# Patient Record
Sex: FEMALE | Race: WHITE
[De-identification: names, ages, dates, MRNs, and addresses within clinical notes are randomized per-mention and may not be internally consistent; named-entity substitution may affect disease eponyms.]

---

## 2020-08-06 ENCOUNTER — HOSPITAL ENCOUNTER (EMERGENCY)
Dept: HOSPITAL 47 - EC | Age: 30
Discharge: HOME | End: 2020-08-06
Payer: COMMERCIAL

## 2020-08-06 VITALS — RESPIRATION RATE: 16 BRPM | SYSTOLIC BLOOD PRESSURE: 127 MMHG | DIASTOLIC BLOOD PRESSURE: 68 MMHG | HEART RATE: 95 BPM

## 2020-08-06 VITALS — TEMPERATURE: 97.3 F

## 2020-08-06 DIAGNOSIS — R10.11: ICD-10-CM

## 2020-08-06 DIAGNOSIS — K21.9: Primary | ICD-10-CM

## 2020-08-06 DIAGNOSIS — F41.9: ICD-10-CM

## 2020-08-06 DIAGNOSIS — R07.89: ICD-10-CM

## 2020-08-06 DIAGNOSIS — R19.7: ICD-10-CM

## 2020-08-06 DIAGNOSIS — R11.10: ICD-10-CM

## 2020-08-06 DIAGNOSIS — Z79.3: ICD-10-CM

## 2020-08-06 DIAGNOSIS — R25.1: ICD-10-CM

## 2020-08-06 DIAGNOSIS — R51: ICD-10-CM

## 2020-08-06 DIAGNOSIS — R06.02: ICD-10-CM

## 2020-08-06 DIAGNOSIS — R74.8: ICD-10-CM

## 2020-08-06 DIAGNOSIS — Z79.899: ICD-10-CM

## 2020-08-06 LAB
ALBUMIN SERPL-MCNC: 4.4 G/DL (ref 3.5–5)
ALP SERPL-CCNC: 84 U/L (ref 38–126)
ALT SERPL-CCNC: 71 U/L (ref 4–34)
ANION GAP SERPL CALC-SCNC: 9 MMOL/L
AST SERPL-CCNC: 49 U/L (ref 14–36)
BASOPHILS # BLD AUTO: 0.1 K/UL (ref 0–0.2)
BASOPHILS NFR BLD AUTO: 1 %
BUN SERPL-SCNC: 14 MG/DL (ref 7–17)
CALCIUM SPEC-MCNC: 10 MG/DL (ref 8.4–10.2)
CHLORIDE SERPL-SCNC: 102 MMOL/L (ref 98–107)
CO2 SERPL-SCNC: 25 MMOL/L (ref 22–30)
EOSINOPHIL # BLD AUTO: 0.4 K/UL (ref 0–0.7)
EOSINOPHIL NFR BLD AUTO: 5 %
ERYTHROCYTE [DISTWIDTH] IN BLOOD BY AUTOMATED COUNT: 4.82 M/UL (ref 3.8–5.4)
ERYTHROCYTE [DISTWIDTH] IN BLOOD: 12.6 % (ref 11.5–15.5)
GLUCOSE SERPL-MCNC: 112 MG/DL (ref 74–99)
HCT VFR BLD AUTO: 43.5 % (ref 34–46)
HGB BLD-MCNC: 14.7 GM/DL (ref 11.4–16)
LYMPHOCYTES # SPEC AUTO: 1.8 K/UL (ref 1–4.8)
LYMPHOCYTES NFR SPEC AUTO: 23 %
MCH RBC QN AUTO: 30.6 PG (ref 25–35)
MCHC RBC AUTO-ENTMCNC: 33.8 G/DL (ref 31–37)
MCV RBC AUTO: 90.4 FL (ref 80–100)
MONOCYTES # BLD AUTO: 0.4 K/UL (ref 0–1)
MONOCYTES NFR BLD AUTO: 5 %
NEUTROPHILS # BLD AUTO: 5.1 K/UL (ref 1.3–7.7)
NEUTROPHILS NFR BLD AUTO: 64 %
PH UR: 5.5 [PH] (ref 5–8)
PLATELET # BLD AUTO: 318 K/UL (ref 150–450)
POTASSIUM SERPL-SCNC: 4 MMOL/L (ref 3.5–5.1)
PROT SERPL-MCNC: 7.2 G/DL (ref 6.3–8.2)
RBC UR QL: 1 /HPF (ref 0–5)
SODIUM SERPL-SCNC: 136 MMOL/L (ref 137–145)
SP GR UR: 1.03 (ref 1–1.03)
SQUAMOUS UR QL AUTO: 5 /HPF (ref 0–4)
UROBILINOGEN UR QL STRIP: <2 MG/DL (ref ?–2)
WBC # BLD AUTO: 7.9 K/UL (ref 3.8–10.6)
WBC # UR AUTO: 2 /HPF (ref 0–5)

## 2020-08-06 PROCEDURE — 93005 ELECTROCARDIOGRAM TRACING: CPT

## 2020-08-06 PROCEDURE — 36415 COLL VENOUS BLD VENIPUNCTURE: CPT

## 2020-08-06 PROCEDURE — 80053 COMPREHEN METABOLIC PANEL: CPT

## 2020-08-06 PROCEDURE — 81025 URINE PREGNANCY TEST: CPT

## 2020-08-06 PROCEDURE — 83690 ASSAY OF LIPASE: CPT

## 2020-08-06 PROCEDURE — 76705 ECHO EXAM OF ABDOMEN: CPT

## 2020-08-06 PROCEDURE — 81001 URINALYSIS AUTO W/SCOPE: CPT

## 2020-08-06 PROCEDURE — 85379 FIBRIN DEGRADATION QUANT: CPT

## 2020-08-06 PROCEDURE — 85025 COMPLETE CBC W/AUTO DIFF WBC: CPT

## 2020-08-06 PROCEDURE — 96361 HYDRATE IV INFUSION ADD-ON: CPT

## 2020-08-06 PROCEDURE — 99285 EMERGENCY DEPT VISIT HI MDM: CPT

## 2020-08-06 PROCEDURE — 96374 THER/PROPH/DIAG INJ IV PUSH: CPT

## 2020-08-06 PROCEDURE — 83605 ASSAY OF LACTIC ACID: CPT

## 2020-08-06 PROCEDURE — 84484 ASSAY OF TROPONIN QUANT: CPT

## 2020-08-06 PROCEDURE — 71046 X-RAY EXAM CHEST 2 VIEWS: CPT

## 2020-08-06 NOTE — ED
General Adult HPI





- General


Chief complaint: Chest Pain


Stated complaint: chest pain, shortness of breath


Time Seen by Provider: 08/06/20 14:20


Source: patient


Mode of arrival: ambulatory


Limitations: no limitations





- History of Present Illness


Initial comments: 





Patient is a 29-year-old female presenting to the emergency department with 

multiple complaints.  Patient states that today while she was working she 

experience some blurry vision followed by a slight headache.  During this time 

patient also described an episode of chest tightness, shaking that she believes 

is from anxiety.  Patient states this chest tightness did become somewhat 

painful.  She said this episode lasted approximately one hour.  Patient has also

been experiencing epigastric discomfort for the past 3 weeks.  She does have a 

history of ulcers.  She denies any other abdominal surgeries.  She states she 

has been having intermittent diarrhea as well as increased pain after she eats 

food.  She has had a few episodes of vomiting over the past 3 weeks.  She 

describes the pain as epigastric, slightly to the right upper quadrant pain.  

She denies being pregnant at this time, is on BCP.  She denies any fever, 

chills, shortness of breath.  She denies history of blood clots, recent 

medication changes.  She has no further complaints at this time.  Upon arrival 

to the ER, patient is slightly tachycardia at 110, rest of vitals are normal.





- Related Data


                                Home Medications











 Medication  Instructions  Recorded  Confirmed


 


Cetirizine HCl [Zyrtec] 10 mg PO DAILY 08/06/20 08/06/20


 


Cider Vinegar [Apple Cider Vinegar] 300 mg PO DAILY 08/06/20 08/06/20


 


Amanda Fe 1.5-30 1 tab PO DAILY 08/06/20 08/06/20


 


Multivitamins, Thera [Multivitamin 1 tab PO DAILY 08/06/20 08/06/20





(formulary)]   








                                  Previous Rx's











 Medication  Instructions  Recorded


 


Omeprazole 40 mg PO DAILY 30 Days #30 08/06/20





 capsule. 











                                    Allergies











Allergy/AdvReac Type Severity Reaction Status Date / Time


 


No Known Allergies Allergy   Verified 08/06/20 15:05














Review of Systems


ROS Statement: 


Those systems with pertinent positive or pertinent negative responses have been 

documented in the HPI.





ROS Other: All systems not noted in ROS Statement are negative.





Past Medical History


Past Medical History: GERD/Reflux


History of Any Multi-Drug Resistant Organisms: None Reported


Past Surgical History: Orthopedic Surgery


Additional Past Surgical History / Comment(s): rt wrist


Past Psychological History: Anxiety


Smoking Status: Never smoker


Past Alcohol Use History: Rare


Past Drug Use History: None Reported





General Exam





- General Exam Comments


Initial Comments: 





GENERAL: 


Patient is well-developed and well-nourished.  Patient is nontoxic and in no 

acute distress.





HEAD: 


Atraumatic, normocephalic.





EYES:


Pupils equal round and reactive to light, extraocular movements intact, sclera 

anicteric, conjunctiva are normal.  Eyelids were unremarkable.





ENT: 


TMs normal, nares patent, oropharynx clear without exudates.  Moist mucous 

membranes.





NECK: 


Normal range of motion, supple without lymphadenopathy or JVD.





LUNGS:


Unlabored respirations.  Breath sounds clear to auscultation bilaterally and 

equal.  No wheezes rales or rhonchi.





HEART:


Regular rate and rhythm without murmurs, rubs or gallops.





ABDOMEN: 


Epigastric tenderness to palpation, mild right upper quadrant tenderness.  Soft,

normoactive bowel sounds.  No guarding, no rebound.  No masses appreciated.





: Deferred 





MUSCULOSKELETAL: 


Normal extremities with adequate strength and normal range of motion, no pitting

or edema.  No clubbing or cyanosis.





NEUROLOGICAL: 


Patient is alert and oriented x 3.  Motor and sensory are also intact.  Cranial 

nerves II through XII grossly intact.  Symmetrical smile.  Normal speech, normal

gait.   





PSYCH:


Normal mood, normal affect.





SKIN:


 Warm, Dry, normal turgor, no rashes or lesions noted.


Limitations: no limitations





Course


                                   Vital Signs











  08/06/20 08/06/20





  14:10 18:00


 


Temperature 97.3 F L 97.3 F L


 


Pulse Rate 110 H 95


 


Respiratory 20 16





Rate  


 


Blood Pressure 147/88 127/68


 


O2 Sat by Pulse 99 99





Oximetry  














EKG Findings





- EKG Comments:


EKG Findings:: Sinus tachycardia otherwise a normal ECG, no signs of acute 

ischemia.  Ventricular rate 102, AL interval 134, .





Medical Decision Making





- Medical Decision Making





Patient is a 29-year-old female here with multiple complaints including an 

episode of chest tightness, shortness of breath that lasted approximately one 

hour as well as 3 weeks of intermittent epigastric, right upper quadrant pain.  

Patient was slightly tachycardia upon arrival, rest of vital signs were normal. 

On exam patient had some epigastric, right upper quadrant tenderness, rest of 

exam is unremarkable, no neural deficits.  Lab work shows no acute abnormalities

except for slight elevation in liver enzymes.  Lipase was normal, lactic acid 

was normal.  At that time I did order an ultrasound of the gallbladder which 

revealed no significant findings, no evidence of acute cholecystitis.  Patient 

troponin and d-dimer were also normal.  Urine showed no evidence of infection.  

Patient was given some fluids, Protonix as well as a GI cocktail and reported 

improvement in her symptoms.  She has had no further chest complaints during her

ER stay.  I discussed with patient that her chest discomfort could've been 

related to an anxiety and tach, patient agrees with this.  I do feel like her 

epigastric pain could be related to her gallbladder or possible ulcer.  She does

have a history of an ulcer.  I will start patient on omeprazole 40 mg daily and 

will have her follow up with GI.  I recommended Tylenol or Motrin for her mild 

headache.  We did discuss limiting her up close computer usage for 1-2 days.  

Patient is in agreement with this plan of care and she stable for discharge.  

Return parameters were discussed with the patient she verbalized understanding. 

Case discussed with Dr. Martinez.





- Lab Data


Result diagrams: 


                                 08/06/20 15:14





                                 08/06/20 15:14


                                   Lab Results











  08/06/20 08/06/20 08/06/20 Range/Units





  15:08 15:08 15:14 


 


WBC    7.9  (3.8-10.6)  k/uL


 


RBC    4.82  (3.80-5.40)  m/uL


 


Hgb    14.7  (11.4-16.0)  gm/dL


 


Hct    43.5  (34.0-46.0)  %


 


MCV    90.4  (80.0-100.0)  fL


 


MCH    30.6  (25.0-35.0)  pg


 


MCHC    33.8  (31.0-37.0)  g/dL


 


RDW    12.6  (11.5-15.5)  %


 


Plt Count    318  (150-450)  k/uL


 


Neutrophils %    64  %


 


Lymphocytes %    23  %


 


Monocytes %    5  %


 


Eosinophils %    5  %


 


Basophils %    1  %


 


Neutrophils #    5.1  (1.3-7.7)  k/uL


 


Lymphocytes #    1.8  (1.0-4.8)  k/uL


 


Monocytes #    0.4  (0-1.0)  k/uL


 


Eosinophils #    0.4  (0-0.7)  k/uL


 


Basophils #    0.1  (0-0.2)  k/uL


 


D-Dimer  0.46    (<0.60)  mg/L FEU


 


Sodium     (137-145)  mmol/L


 


Potassium     (3.5-5.1)  mmol/L


 


Chloride     ()  mmol/L


 


Carbon Dioxide     (22-30)  mmol/L


 


Anion Gap     mmol/L


 


BUN     (7-17)  mg/dL


 


Creatinine     (0.52-1.04)  mg/dL


 


Est GFR (CKD-EPI)AfAm     (>60 ml/min/1.73 sqM)  


 


Est GFR (CKD-EPI)NonAf     (>60 ml/min/1.73 sqM)  


 


Glucose     (74-99)  mg/dL


 


Plasma Lactic Acid Howard     (0.7-2.0)  mmol/L


 


Calcium     (8.4-10.2)  mg/dL


 


Total Bilirubin     (0.2-1.3)  mg/dL


 


AST     (14-36)  U/L


 


ALT     (4-34)  U/L


 


Alkaline Phosphatase     ()  U/L


 


Troponin I   <0.012   (0.000-0.034)  ng/mL


 


Total Protein     (6.3-8.2)  g/dL


 


Albumin     (3.5-5.0)  g/dL


 


Lipase     ()  U/L


 


Urine Color     


 


Urine Appearance     (Clear)  


 


Urine pH     (5.0-8.0)  


 


Ur Specific Gravity     (1.001-1.035)  


 


Urine Protein     (Negative)  


 


Urine Glucose (UA)     (Negative)  


 


Urine Ketones     (Negative)  


 


Urine Blood     (Negative)  


 


Urine Nitrite     (Negative)  


 


Urine Bilirubin     (Negative)  


 


Urine Urobilinogen     (<2.0)  mg/dL


 


Ur Leukocyte Esterase     (Negative)  


 


Urine RBC     (0-5)  /hpf


 


Urine WBC     (0-5)  /hpf


 


Ur Squamous Epith Cells     (0-4)  /hpf


 


Urine Bacteria     (None)  /hpf


 


Urine Mucus     (None)  /hpf


 


Urine HCG, Qual     (Not Detectd)  














  08/06/20 08/06/20 08/06/20 Range/Units





  15:14 15:14 15:14 


 


WBC     (3.8-10.6)  k/uL


 


RBC     (3.80-5.40)  m/uL


 


Hgb     (11.4-16.0)  gm/dL


 


Hct     (34.0-46.0)  %


 


MCV     (80.0-100.0)  fL


 


MCH     (25.0-35.0)  pg


 


MCHC     (31.0-37.0)  g/dL


 


RDW     (11.5-15.5)  %


 


Plt Count     (150-450)  k/uL


 


Neutrophils %     %


 


Lymphocytes %     %


 


Monocytes %     %


 


Eosinophils %     %


 


Basophils %     %


 


Neutrophils #     (1.3-7.7)  k/uL


 


Lymphocytes #     (1.0-4.8)  k/uL


 


Monocytes #     (0-1.0)  k/uL


 


Eosinophils #     (0-0.7)  k/uL


 


Basophils #     (0-0.2)  k/uL


 


D-Dimer     (<0.60)  mg/L FEU


 


Sodium    136 L  (137-145)  mmol/L


 


Potassium    4.0  (3.5-5.1)  mmol/L


 


Chloride    102  ()  mmol/L


 


Carbon Dioxide    25  (22-30)  mmol/L


 


Anion Gap    9  mmol/L


 


BUN    14  (7-17)  mg/dL


 


Creatinine    0.85  (0.52-1.04)  mg/dL


 


Est GFR (CKD-EPI)AfAm    >90  (>60 ml/min/1.73 sqM)  


 


Est GFR (CKD-EPI)NonAf    >90  (>60 ml/min/1.73 sqM)  


 


Glucose    112 H  (74-99)  mg/dL


 


Plasma Lactic Acid Howard     (0.7-2.0)  mmol/L


 


Calcium    10.0  (8.4-10.2)  mg/dL


 


Total Bilirubin    0.6  (0.2-1.3)  mg/dL


 


AST    49 H  (14-36)  U/L


 


ALT    71 H  (4-34)  U/L


 


Alkaline Phosphatase    84  ()  U/L


 


Troponin I     (0.000-0.034)  ng/mL


 


Total Protein    7.2  (6.3-8.2)  g/dL


 


Albumin    4.4  (3.5-5.0)  g/dL


 


Lipase    138  ()  U/L


 


Urine Color  Yellow    


 


Urine Appearance  Cloudy H    (Clear)  


 


Urine pH  5.5    (5.0-8.0)  


 


Ur Specific Gravity  1.029    (1.001-1.035)  


 


Urine Protein  Negative    (Negative)  


 


Urine Glucose (UA)  Negative    (Negative)  


 


Urine Ketones  Trace H    (Negative)  


 


Urine Blood  Negative    (Negative)  


 


Urine Nitrite  Negative    (Negative)  


 


Urine Bilirubin  Negative    (Negative)  


 


Urine Urobilinogen  <2.0    (<2.0)  mg/dL


 


Ur Leukocyte Esterase  Negative    (Negative)  


 


Urine RBC  1    (0-5)  /hpf


 


Urine WBC  2    (0-5)  /hpf


 


Ur Squamous Epith Cells  5 H    (0-4)  /hpf


 


Urine Bacteria  Rare H    (None)  /hpf


 


Urine Mucus  Many H    (None)  /hpf


 


Urine HCG, Qual   Not Detected   (Not Detectd)  














  08/06/20 Range/Units





  15:14 


 


WBC   (3.8-10.6)  k/uL


 


RBC   (3.80-5.40)  m/uL


 


Hgb   (11.4-16.0)  gm/dL


 


Hct   (34.0-46.0)  %


 


MCV   (80.0-100.0)  fL


 


MCH   (25.0-35.0)  pg


 


MCHC   (31.0-37.0)  g/dL


 


RDW   (11.5-15.5)  %


 


Plt Count   (150-450)  k/uL


 


Neutrophils %   %


 


Lymphocytes %   %


 


Monocytes %   %


 


Eosinophils %   %


 


Basophils %   %


 


Neutrophils #   (1.3-7.7)  k/uL


 


Lymphocytes #   (1.0-4.8)  k/uL


 


Monocytes #   (0-1.0)  k/uL


 


Eosinophils #   (0-0.7)  k/uL


 


Basophils #   (0-0.2)  k/uL


 


D-Dimer   (<0.60)  mg/L FEU


 


Sodium   (137-145)  mmol/L


 


Potassium   (3.5-5.1)  mmol/L


 


Chloride   ()  mmol/L


 


Carbon Dioxide   (22-30)  mmol/L


 


Anion Gap   mmol/L


 


BUN   (7-17)  mg/dL


 


Creatinine   (0.52-1.04)  mg/dL


 


Est GFR (CKD-EPI)AfAm   (>60 ml/min/1.73 sqM)  


 


Est GFR (CKD-EPI)NonAf   (>60 ml/min/1.73 sqM)  


 


Glucose   (74-99)  mg/dL


 


Plasma Lactic Acid Howard  1.0  (0.7-2.0)  mmol/L


 


Calcium   (8.4-10.2)  mg/dL


 


Total Bilirubin   (0.2-1.3)  mg/dL


 


AST   (14-36)  U/L


 


ALT   (4-34)  U/L


 


Alkaline Phosphatase   ()  U/L


 


Troponin I   (0.000-0.034)  ng/mL


 


Total Protein   (6.3-8.2)  g/dL


 


Albumin   (3.5-5.0)  g/dL


 


Lipase   ()  U/L


 


Urine Color   


 


Urine Appearance   (Clear)  


 


Urine pH   (5.0-8.0)  


 


Ur Specific Gravity   (1.001-1.035)  


 


Urine Protein   (Negative)  


 


Urine Glucose (UA)   (Negative)  


 


Urine Ketones   (Negative)  


 


Urine Blood   (Negative)  


 


Urine Nitrite   (Negative)  


 


Urine Bilirubin   (Negative)  


 


Urine Urobilinogen   (<2.0)  mg/dL


 


Ur Leukocyte Esterase   (Negative)  


 


Urine RBC   (0-5)  /hpf


 


Urine WBC   (0-5)  /hpf


 


Ur Squamous Epith Cells   (0-4)  /hpf


 


Urine Bacteria   (None)  /hpf


 


Urine Mucus   (None)  /hpf


 


Urine HCG, Qual   (Not Detectd)  














Disposition


Clinical Impression: 


 Anxiety, Epigastric abdominal pain, GERD (gastroesophageal reflux disease)





Disposition: HOME SELF-CARE


Condition: Stable


Instructions (If sedation given, give patient instructions):  Gastritis (ED)


Additional Instructions: 


Please return to the Emergency Department if symptoms worsen or any other 

concerns.


Recommend taking omeprazole daily for 2-4 weeks.  


Follow-up with PCP and/or GI specialist.


Prescriptions: 


Omeprazole 40 mg PO DAILY 30 Days #30 capsule.dr


Is patient prescribed a controlled substance at d/c from ED?: No


Referrals: 


Nighat Rubin MD [Primary Care Provider] - 1-2 days


Debora Pemberton MD [STAFF PHYSICIAN] - 1-2 days

## 2020-08-06 NOTE — XR
EXAMINATION TYPE: XR chest 2V

 

DATE OF EXAM: 8/6/2020

 

COMPARISON: NONE

 

HISTORY: Intermittent chest pain and heaviness.

 

TECHNIQUE:  Frontal and lateral views of the chest are obtained.

 

FINDINGS:  There is no focal air space opacity, pleural effusion, or pneumothorax seen.  The cardiac 
silhouette size is within normal limits.   The osseous structures are intact.

 

IMPRESSION:  No acute cardiopulmonary process.

## 2020-08-06 NOTE — US
EXAMINATION TYPE: US gallbladder

 

DATE OF EXAM: 8/6/2020

 

COMPARISON: NONE

 

CLINICAL HISTORY: epigastric. RUQ pain. abd pain

 

EXAM MEASUREMENTS:

 

Liver Length:  13.6 cm   

Gallbladder Wall:  0.3 cm   

CBD:  0.5 cm

Right Kidney:  10.3 x 4.4 x 4.1 cm

 

Note:  Patient ate lunch.

 

Pancreas:  wnl

Liver:  wnl  

Gallbladder:  wall appears borderline thick but patient NPO 4hrs. 

**Evidence for sonographic Grande's sign:  no

CBD:  wnl 

Right Kidney:  wnl 

 

 

IMPRESSION: No acute sonographic process.

## 2022-12-14 ENCOUNTER — HOSPITAL ENCOUNTER (OUTPATIENT)
Dept: HOSPITAL 47 - OR | Age: 32
Discharge: HOME | End: 2022-12-14
Attending: SURGERY
Payer: COMMERCIAL

## 2022-12-14 VITALS — HEART RATE: 70 BPM | DIASTOLIC BLOOD PRESSURE: 71 MMHG | SYSTOLIC BLOOD PRESSURE: 101 MMHG

## 2022-12-14 VITALS — BODY MASS INDEX: 41.1 KG/M2

## 2022-12-14 VITALS — TEMPERATURE: 97.3 F

## 2022-12-14 VITALS — RESPIRATION RATE: 18 BRPM

## 2022-12-14 DIAGNOSIS — Z87.09: ICD-10-CM

## 2022-12-14 DIAGNOSIS — Z79.899: ICD-10-CM

## 2022-12-14 DIAGNOSIS — Z98.890: ICD-10-CM

## 2022-12-14 DIAGNOSIS — Z87.11: ICD-10-CM

## 2022-12-14 DIAGNOSIS — T75.3XXD: ICD-10-CM

## 2022-12-14 DIAGNOSIS — Z88.1: ICD-10-CM

## 2022-12-14 DIAGNOSIS — Z91.048: ICD-10-CM

## 2022-12-14 DIAGNOSIS — F17.200: ICD-10-CM

## 2022-12-14 DIAGNOSIS — K21.9: ICD-10-CM

## 2022-12-14 DIAGNOSIS — F10.90: ICD-10-CM

## 2022-12-14 DIAGNOSIS — K81.1: Primary | ICD-10-CM

## 2022-12-14 PROCEDURE — 47562 LAPAROSCOPIC CHOLECYSTECTOMY: CPT

## 2022-12-14 PROCEDURE — 88304 TISSUE EXAM BY PATHOLOGIST: CPT

## 2022-12-14 PROCEDURE — 81025 URINE PREGNANCY TEST: CPT

## 2022-12-14 NOTE — P.OP
Date of Procedure: 12/14/22


Preoperative Diagnosis: 


Chronic cholecystitis


Postoperative Diagnosis: 


Chronic cholecystitis


Procedure(s) Performed: 


Laparoscopic cholecystectomy


Anesthesia: ROLAND


Surgeon: Varghese Powers


Estimated Blood Loss (ml): 5


Pathology: other (Gallbladder)


Condition: stable


Disposition: PACU


Description of Procedure: 


The patient was placed on the operating table.   The patient received a general 

endotracheal tube anesthesia.    The patients abdomen was prepped and draped in

the usual sterile fashion.    Through an infraumbilical stab incision, the 

fascia of the anterior abdominal wall was grasped with a pair of Kochers and 

then the Veress needle was placed in the peritoneal cavity.   Position of the 

Veress needle was confirmed with positive drop test.   The abdomen was then 

insufflated.  After adequate insufflation, the 10 mm trocar was placed in the 

peritoneal cavity.    Following this the laparoscope was placed in the 

peritoneal cavity. The patient was placed in the head-up, right side up position

and then a 5 mm trocar was placed in the right lateral and right subcostal 

position under direct visualization.    A 8 mm trocar was placed in the 

epigastric position.  The gallbladder was grasped in the fundus and 

infundibulum.  Traction  on the gallbladder was placed in the lateral and the 

cephalad positions.  The triangle of Calot  was visualized..   The cystic duct 

was bluntly dissected until the union of the cystic duct and common bile duct 

was seen.   A critical view of safety was achieved.  The cystic duct was then 

divided and sealed with the Harmonic scissors.  A PDS Endoloop was then placed 

throughout the cystic duct stump.  The cystic artery divided and sealed with the

Harmonic scissors.   The gallbladder was then removed from the liver bed using 

Harmonic scissors.   The gallbladder was then extracted through the epigastric 

port site.  Operative field was checked for any bleeding spots and Harmonic 

scissors was used to coagulate the liver bed.    The abdomen was irrigated.   

The trocars were removed.  The skin was closed using interrupted 3-0 Vicryl 

suture.   Dermabond dressing were applied.   The patient tolerated the procedure

well.

## 2022-12-14 NOTE — P.GSHP
History of Present Illness


H&P Date: 12/14/22


Chief Complaint: Right upper quadrant pain





[age]-year-old female who presents today for laparoscopically 

cholecystectomy.  Patient's liquids were quadrant pain.  Her recent ultrasound 

shows and the gallbladder consistent with chronic cholecystitis.





Past Medical History


Past Medical History: GERD/Reflux


Additional Past Medical History / Comment(s): Hx Asthma as a child, Hx stomach 

ulcers ., gall bladder pain and diarrhea.


History of Any Multi-Drug Resistant Organisms: None Reported


Past Surgical History: Orthopedic Surgery


Additional Past Surgical History / Comment(s): rt wrist surgery with hardware 

and removal, egd.


Past Anesthesia/Blood Transfusion Reactions: Motion Sickness


Additional Past Anesthesia/Blood Transfusion Reaction / Comment(s): "Woke up too

early."


Past Psychological History: No Psychological Hx Reported


Smoking Status: Never smoker


Past Alcohol Use History: Occasional


Past Drug Use History: None Reported





- Past Family History


  ** Mother


Family Medical History: No Reported History





Medications and Allergies


                                Home Medications











 Medication  Instructions  Recorded  Confirmed  Type


 


Cetirizine HCl [Zyrtec] 10 mg PO DAILY 08/06/20 12/14/22 History


 


Drospirenone [Slynd] 4 mg PO QAM 12/09/22 12/14/22 History








                                    Allergies











Allergy/AdvReac Type Severity Reaction Status Date / Time


 


cat dander Allergy Unknown Unknown Verified 12/14/22 09:30


 


bacitracin Allergy  Rash/Hives Verified 12/14/22 09:30





[From Neosporin     





([id number])]     


 


neomycin Allergy  Rash/Hives Verified 12/14/22 09:30





[From Neosporin     





([id number])]     


 


polymyxin B Allergy  Rash/Hives Verified 12/14/22 09:30





[From Neosporin     





([id number])]     


 


adhesive AdvReac Unknown rash, Verified 12/14/22 09:30





   tears skin  














Surgical - Exam


                                   Vital Signs











Temp Pulse Resp BP Pulse Ox


 


 98.3 F   108 H  18   123/70   98 


 


 12/14/22 09:36  12/14/22 09:36  12/14/22 09:36  12/14/22 09:36  12/14/22 09:36














- General


well developed, well nourished, no distress





- Eyes


PERRL





- ENT


normal pinna





- Neck


no masses





- Respiratory


normal expansion





- Cardiovascular


Rhythm: regular





- Abdomen


Abdomen: soft, non tender





Assessment and Plan


Assessment: 





Right upper quadrant pain





Chronic cholecystitis





We'll perform laparoscopic cholecystectomy

## 2023-04-25 ENCOUNTER — HOSPITAL ENCOUNTER (OUTPATIENT)
Dept: HOSPITAL 47 - EC | Age: 33
Setting detail: OBSERVATION
LOS: 2 days | Discharge: HOME | End: 2023-04-27
Attending: HOSPITALIST | Admitting: HOSPITALIST
Payer: COMMERCIAL

## 2023-04-25 DIAGNOSIS — Z79.82: ICD-10-CM

## 2023-04-25 DIAGNOSIS — Z87.19: ICD-10-CM

## 2023-04-25 DIAGNOSIS — Z88.3: ICD-10-CM

## 2023-04-25 DIAGNOSIS — Z88.8: ICD-10-CM

## 2023-04-25 DIAGNOSIS — R73.03: ICD-10-CM

## 2023-04-25 DIAGNOSIS — Z91.09: ICD-10-CM

## 2023-04-25 DIAGNOSIS — Z90.49: ICD-10-CM

## 2023-04-25 DIAGNOSIS — R00.0: Primary | ICD-10-CM

## 2023-04-25 DIAGNOSIS — E66.01: ICD-10-CM

## 2023-04-25 DIAGNOSIS — Z20.822: ICD-10-CM

## 2023-04-25 DIAGNOSIS — Z87.11: ICD-10-CM

## 2023-04-25 DIAGNOSIS — Z79.3: ICD-10-CM

## 2023-04-25 DIAGNOSIS — K21.9: ICD-10-CM

## 2023-04-25 DIAGNOSIS — Z91.128: ICD-10-CM

## 2023-04-25 DIAGNOSIS — Z79.899: ICD-10-CM

## 2023-04-25 DIAGNOSIS — R79.1: ICD-10-CM

## 2023-04-25 DIAGNOSIS — Z98.890: ICD-10-CM

## 2023-04-25 DIAGNOSIS — E78.5: ICD-10-CM

## 2023-04-25 LAB
ALBUMIN SERPL-MCNC: 4.2 G/DL (ref 3.5–5)
ALP SERPL-CCNC: 69 U/L (ref 38–126)
ALT SERPL-CCNC: 29 U/L (ref 4–34)
ANION GAP SERPL CALC-SCNC: 11 MMOL/L
APTT BLD: 20.8 SEC (ref 22–30)
AST SERPL-CCNC: 27 U/L (ref 14–36)
BASOPHILS # BLD AUTO: 0 K/UL (ref 0–0.2)
BASOPHILS NFR BLD AUTO: 0 %
BUN SERPL-SCNC: 13 MG/DL (ref 7–17)
CALCIUM SPEC-MCNC: 9 MG/DL (ref 8.4–10.2)
CHLORIDE SERPL-SCNC: 103 MMOL/L (ref 98–107)
CO2 SERPL-SCNC: 23 MMOL/L (ref 22–30)
EOSINOPHIL # BLD AUTO: 0.1 K/UL (ref 0–0.7)
EOSINOPHIL NFR BLD AUTO: 1 %
ERYTHROCYTE [DISTWIDTH] IN BLOOD BY AUTOMATED COUNT: 4.74 M/UL (ref 3.8–5.4)
ERYTHROCYTE [DISTWIDTH] IN BLOOD: 12.8 % (ref 11.5–15.5)
GLUCOSE SERPL-MCNC: 102 MG/DL (ref 74–99)
HCT VFR BLD AUTO: 44.2 % (ref 34–46)
HGB BLD-MCNC: 14.6 GM/DL (ref 11.4–16)
INR PPP: 0.9 (ref ?–1.2)
LDH SPEC-CCNC: 209 U/L (ref 120–246)
LYMPHOCYTES # SPEC AUTO: 1 K/UL (ref 1–4.8)
LYMPHOCYTES NFR SPEC AUTO: 6 %
MAGNESIUM SPEC-SCNC: 1.8 MG/DL (ref 1.6–2.3)
MCH RBC QN AUTO: 30.8 PG (ref 25–35)
MCHC RBC AUTO-ENTMCNC: 33 G/DL (ref 31–37)
MCV RBC AUTO: 93.2 FL (ref 80–100)
MONOCYTES # BLD AUTO: 0.6 K/UL (ref 0–1)
MONOCYTES NFR BLD AUTO: 4 %
NEUTROPHILS # BLD AUTO: 14.9 K/UL (ref 1.3–7.7)
NEUTROPHILS NFR BLD AUTO: 89 %
PH UR: 5.5 [PH] (ref 5–8)
PLATELET # BLD AUTO: 286 K/UL (ref 150–450)
POTASSIUM SERPL-SCNC: 4 MMOL/L (ref 3.5–5.1)
PROT SERPL-MCNC: 7 G/DL (ref 6.3–8.2)
PT BLD: 9.3 SEC (ref 9–12)
RBC UR QL: 4 /HPF (ref 0–5)
SODIUM SERPL-SCNC: 137 MMOL/L (ref 137–145)
SP GR UR: 1.02 (ref 1–1.03)
SQUAMOUS UR QL AUTO: 13 /HPF (ref 0–4)
UROBILINOGEN UR QL STRIP: <2 MG/DL (ref ?–2)
WBC # BLD AUTO: 16.7 K/UL (ref 3.8–10.6)
WBC #/AREA URNS HPF: 7 /HPF (ref 0–5)

## 2023-04-25 PROCEDURE — 87077 CULTURE AEROBIC IDENTIFY: CPT

## 2023-04-25 PROCEDURE — 85610 PROTHROMBIN TIME: CPT

## 2023-04-25 PROCEDURE — 81001 URINALYSIS AUTO W/SCOPE: CPT

## 2023-04-25 PROCEDURE — 72192 CT PELVIS W/O DYE: CPT

## 2023-04-25 PROCEDURE — 84703 CHORIONIC GONADOTROPIN ASSAY: CPT

## 2023-04-25 PROCEDURE — 80053 COMPREHEN METABOLIC PANEL: CPT

## 2023-04-25 PROCEDURE — 86140 C-REACTIVE PROTEIN: CPT

## 2023-04-25 PROCEDURE — 85379 FIBRIN DEGRADATION QUANT: CPT

## 2023-04-25 PROCEDURE — 93005 ELECTROCARDIOGRAM TRACING: CPT

## 2023-04-25 PROCEDURE — 80306 DRUG TEST PRSMV INSTRMNT: CPT

## 2023-04-25 PROCEDURE — 83036 HEMOGLOBIN GLYCOSYLATED A1C: CPT

## 2023-04-25 PROCEDURE — 84484 ASSAY OF TROPONIN QUANT: CPT

## 2023-04-25 PROCEDURE — 80061 LIPID PANEL: CPT

## 2023-04-25 PROCEDURE — 85025 COMPLETE CBC W/AUTO DIFF WBC: CPT

## 2023-04-25 PROCEDURE — 85730 THROMBOPLASTIN TIME PARTIAL: CPT

## 2023-04-25 PROCEDURE — 80048 BASIC METABOLIC PNL TOTAL CA: CPT

## 2023-04-25 PROCEDURE — 36415 COLL VENOUS BLD VENIPUNCTURE: CPT

## 2023-04-25 PROCEDURE — 84145 PROCALCITONIN (PCT): CPT

## 2023-04-25 PROCEDURE — 93351 STRESS TTE COMPLETE: CPT

## 2023-04-25 PROCEDURE — 71046 X-RAY EXAM CHEST 2 VIEWS: CPT

## 2023-04-25 PROCEDURE — 71275 CT ANGIOGRAPHY CHEST: CPT

## 2023-04-25 PROCEDURE — 83735 ASSAY OF MAGNESIUM: CPT

## 2023-04-25 PROCEDURE — 93306 TTE W/DOPPLER COMPLETE: CPT

## 2023-04-25 PROCEDURE — 84443 ASSAY THYROID STIM HORMONE: CPT

## 2023-04-25 PROCEDURE — 87186 SC STD MICRODIL/AGAR DIL: CPT

## 2023-04-25 PROCEDURE — 87636 SARSCOV2 & INF A&B AMP PRB: CPT

## 2023-04-25 PROCEDURE — 83615 LACTATE (LD) (LDH) ENZYME: CPT

## 2023-04-25 PROCEDURE — 87086 URINE CULTURE/COLONY COUNT: CPT

## 2023-04-25 RX ADMIN — NITROGLYCERIN SCH: 20 OINTMENT TOPICAL at 23:06

## 2023-04-25 RX ADMIN — HEPARIN SODIUM SCH: 5000 INJECTION INTRAVENOUS; SUBCUTANEOUS at 20:25

## 2023-04-25 RX ADMIN — NITROGLYCERIN SCH INCH: 20 OINTMENT TOPICAL at 17:33

## 2023-04-25 RX ADMIN — HEPARIN SODIUM SCH: 5000 INJECTION INTRAVENOUS; SUBCUTANEOUS at 15:18

## 2023-04-25 RX ADMIN — CEFAZOLIN SCH MLS/HR: 330 INJECTION, POWDER, FOR SOLUTION INTRAMUSCULAR; INTRAVENOUS at 22:45

## 2023-04-25 NOTE — CT
EXAMINATION TYPE: CT chest angio for PE

 

DATE OF EXAM: 4/25/2023

 

COMPARISON: Radiograph same day.

 

HISTORY: 32-year-old female shortness of breath, elevated d-dimer

 

TECHNIQUE: Contiguous axial scanning of the chest performed with IV Contrast, patient injected with 1
00 mL of Isovue 370. Coronal/sagittal MIP reconstructions performed.

 

CT DLP: 483.6 mGycm

Automated exposure control for dose reduction was used.

 

FINDINGS: 

Heart normal size without pericardial effusion. No flattening of the interventricular septum reflux o
f contrast into the hepatic veins.

 

Aorta normal caliber with conventional arch vessel branching anatomy.

 

No thoracic lymphadenopathy by CT size criteria.

 

There is suboptimal opacification of the pulmonary arterial system with a attenuation of only 141 [place]
nsfield units. No obvious large central pulmonary embolus. Many of the lobar, segmental, and more dis
geeta branches are essentially nondiagnostic for assessment of emboli.

 

No consolidation or pleural effusion.

 

There is a tiny hiatal hernia. Otherwise, visualized upper abdomen shows no gross abnormal body.

 

Bones: No osseous destructive process.

 

 

IMPRESSION: 

SUBOPTIMAL EXAM FOR ASSESSMENT OF PULMONARY EMBOLI. NO DEFINITE LARGE CENTRAL PULMONARY EMBOLUS. LOBA
R, SEGMENTAL, AND MORE DISTAL ARTERIAL BRANCHES ARE VERY LIMITED OR NONDIAGNOSTIC.

 

NO ACUTE PULMONARY PROCESS.

 

TINY HIATAL HERNIA.

## 2023-04-25 NOTE — XR
EXAMINATION TYPE: XR chest 2V

 

DATE OF EXAM: 4/25/2023

 

COMPARISON: 8/6/2020

 

TECHNIQUE: PA and lateral views submitted.

 

HISTORY: Chest pain

 

FINDINGS:

The lungs are clear and  there is no pneumothorax, pleural effusion, or focal pneumonia.  Heart size 
normal and no overt failure. Osseous structures demonstrate hypertrophic and degenerative changes of 
the spine.

 

IMPRESSION: 

1. No acute process.

## 2023-04-25 NOTE — ED
General Adult HPI





- General


Chief complaint: Arrhythmia/Palpitations


Stated complaint: BRIANA chest pain


Time Seen by Provider: 04/25/23 09:15


Source: patient, RN notes reviewed, old records reviewed


Mode of arrival: ambulatory


Limitations: no limitations





- History of Present Illness


Initial comments: 





This is a 32-year-old female who presents emergency Department complaining that 

she woke up about 3 AM with some pressure in her chest which was worse with deep

breathing.  Patient states she had the chills but she is no longer chill.  

Patient states she has an occasional cough but nothing significant.  Patient 

states she doesn't feel short of breath just feels like she has to take a deep 

breath.  Patient also has a mild headache.  Patient denies any abdominal pain 

patient denies nausea vomiting.  Patient denies any swelling to the legs or calf

tenderness.  Patient states she is on birth control.





- Related Data


                                Home Medications











 Medication  Instructions  Recorded  Confirmed


 


Cetirizine HCl [Zyrtec] 10 mg PO DAILY 08/06/20 04/25/23


 


norethindrone-e.estradioL-iron 1 tab PO DAILY 04/25/23 04/25/23





[Amanda Fe 1.5-30 Tablet]   











                                    Allergies











Allergy/AdvReac Type Severity Reaction Status Date / Time


 


cat dander Allergy Unknown Unknown Verified 04/25/23 09:48


 


bacitracin Allergy  Rash/Hives Verified 04/25/23 09:48





[From Neosporin     





([id number])]     


 


neomycin Allergy  Rash/Hives Verified 04/25/23 09:48





[From Neosporin     





([id number])]     


 


polymyxin B Allergy  Rash/Hives Verified 04/25/23 09:48





[From Neosporin     





([id number])]     


 


adhesive AdvReac Unknown rash, Verified 04/25/23 09:48





   tears skin  














Review of Systems


ROS Statement: 


Those systems with pertinent positive or pertinent negative responses have been 

documented in the HPI.





ROS Other: All systems not noted in ROS Statement are negative.





Past Medical History


Past Medical History: GERD/Reflux


Additional Past Medical History / Comment(s): Hx Asthma as a child, Hx stomach 

ulcers ., gall bladder pain and diarrhea.


History of Any Multi-Drug Resistant Organisms: None Reported


Past Surgical History: Cholecystectomy, Orthopedic Surgery


Additional Past Surgical History / Comment(s): rt wrist surgery with hardware 

and removal, egd.


Past Anesthesia/Blood Transfusion Reactions: Motion Sickness


Additional Past Anesthesia/Blood Transfusion Reaction / Comment(s): "Woke up too

early."


Past Psychological History: No Psychological Hx Reported


Smoking Status: Never smoker


Past Alcohol Use History: Occasional


Past Drug Use History: None Reported





- Past Family History


  ** Mother


Family Medical History: No Reported History





General Exam





- General Exam Comments


Initial Comments: 





GENERAL:


Patient is well-developed and well-nourished.  Patient is nontoxic and well-

hydrated and is in no acute distress.  Patient felt warm temp orally was 99.9





ENT:


Neck is soft and supple.  No significant lymphadenopathy is noted.  Oropharynx 

is clear.  Moist mucous membranes.  Neck has full range of motion without 

eliciting any pain.  





EYES:


The sclera were anicteric and conjunctiva were pink and moist.  Extraocular 

movements were intact and pupils were equal round and reactive to light.  

Eyelids were unremarkable.





PULMONARY:


Unlabored respirations.  Good breath sounds bilaterally.  No audible rales 

rhonchi or wheezing was noted.





CARDIOVASCULAR:


Patient is tachycardic at about 125 beats a minute  





ABDOMEN:


Soft and nontender with normal bowel sounds. 





SKIN:


Skin is clear with no lesions or rashes and otherwise unremarkable.





NEUROLOGIC:


Patient is alert and oriented x3.  Cranial nerves II through XII are grossly 

intact.  Motor and sensory are also intact.  Normal speech, volume and content. 

Symmetrical smile. 





MUSCULOSKELETAL:


Normal extremities with adequate strength and full range of motion.  No lower 

extremity swelling or edema.  No calf tenderness.





LYMPHATICS:


No significant lymphadenopathy is noted





PSYCHIATRIC:


Normal psychiatric evaluation.  


Limitations: no limitations





Course


                                   Vital Signs











  04/25/23 04/25/23 04/25/23





  08:59 10:00 12:16


 


Temperature 98.3 F  98.3 F


 


Pulse Rate 140 H 123 H 101 H


 


Respiratory 18 18 18





Rate   


 


Blood Pressure 123/84 114/98 117/75


 


O2 Sat by Pulse 98 97 97





Oximetry   














  04/25/23 04/25/23





  13:16 13:17


 


Temperature  


 


Pulse Rate 133 H 105 H


 


Respiratory  





Rate  


 


Blood Pressure  


 


O2 Sat by Pulse 97 





Oximetry  














Medical Decision Making





- Medical Decision Making





EKG as interpreted by myself shows sinus tachycardia at 126 bpm OK interval 144 

QRS is 84 QT interval 360 QTC is 391.  Patient's EKG shows no ST segment 

elevation or depression.





Was pt. sent in by a medical professional or institution (, PA, NP, urgent 

care, hospital, or nursing home...) When possible be specific


@  -No


Did you speak to anyone other than the patient for history (EMS, parent, family,

police, friend...)? What history was obtained from this source 


@  -No


Did you review nursing and triage notes (agree or disagree)?  Why? 


@  -I reviewed and agree with nursing and triage notes


Were old charts reviewed (outside hosp., previous admission, EMS record, old 

EKG, old radiological studies, urgent care reports/EKG's, nursing home records)?

Report findings 


@  -No old charts were reviewed


Differential Diagnosis (chest pain, altered mental status, abdominal pain women,

abdominal pain men, vaginal bleeding, weakness, fever, dyspnea, syncope, 

headache, dizziness, GI bleed, back pain, seizure, CVA, palpatations, mental 

health, musculoskeletal)? 


@  -Differential Palpitations


Ventricular arrhythmias, atrial arrhythmias, myocardial infarction, anemia, 

thyrotoxicosis, electrolyte imbalance, hypokalemia, pulmonary embolism, 

pulmonary disease, drugs, alcohol, anxiety, stress....


This is not meant to be an all-inclusive list.


EKG interpreted by me (3pts min.).


@  -As above


X-rays interpreted by me (1pt min.).


@  -Chest x-ray was interpreted by myself shows no acute abnormality.


CT interpreted by me (1pt min.).


@  -CT of the chest was interpreted by myself shows no obvious PE


U/S interpreted by me (1pt. min.).


@  -None done


What testing was considered but not performed or refused? (CT, X-rays, U/S, 

labs)? Why?


@  -None


What meds were considered but not given or refused? Why?


@  -None


Did you discuss the management of the patient with other professionals 

(professionals i.e. , PA, NP, lab, RT, psych nurse, , , 

teacher, , )? Give summary


@  -Or he agreed to admit the patient admitted the patient wrote admitting 

orders


Was smoking cessation discussed for >3mins.?


@  -No


Was critical care preformed (if so, how long)?


@  -No


Were there social determinants of health that impacted care today? How? 

(Homelessness, low income, unemployed, alcoholism, drug addiction, 

transportation, low edu. Level, literacy, decrease access to med. care, shelter, 

rehab)?


@  -No


Was there de-escalation of care discussed even if they declined (Discuss DNR or 

withdrawal of care, Hospice)? DNR status


@  -No


What co-morbidities impacted this encounter? (DM, HTN, Smoking, COPD, CAD, Can

cer, CVA, ARF, Chemo, Hep., AIDS, mental health diagnosis, sleep apnea, morbid 

obesity)?


@  -None


Was patient admitted / discharged? Hospital course, mention meds given and 

route, prescriptions, significant lab abnormalities, going to OR and other 

pertinent info.


@  -Patient had a CAT scan and chest x-ray showed no acute abnormality.  Patient

had elevated white count but no source of infection.  Patient remained 

tachycardic even after her temperature came down back to normal.  Patient got up

and ambulate down the patel and back and heart rate was 135 beats a minute.  

Patient continued to feel some chest discomfort I spoke with Dr. Ledbetter he agreed

to admit the patient admitted the patient I consult cardiology 


Undiagnosed new problem with uncertain prognosis?


@  -No


Drug Therapy requiring intensive monitoring for toxicity (Heparin, Nitro, 

Insulin, Cardizem)?


@  -No


Were any procedures done?


@  -No


Diagnosis/symptom?


@  -Tachycardia


Acute, or Chronic, or Acute on Chronic?


@  -Acute


Uncomplicated (without systemic symptoms) or Complicated (systemic symptoms)?


@  -Complicated


Side effects of treatment?


@  -No


Exacerbation, Progression, or Severe Exacerbation?


@  -No


Poses a threat to life or bodily function? How? (Chest pain, USA, MI, pneumonia,

PE, COPD, DKA, ARF, appy, cholecystitis, CVA, Diverticulitis, Homicidal, 

Suicidal, threat to staff... and all critical care pts)


@  -This could lead to poor perfusion and eventually end organ dysfunction





- Lab Data


Result diagrams: 


                                 04/25/23 10:17





                                 04/25/23 10:17


                                   Lab Results











  04/25/23 04/25/23 04/25/23 Range/Units





  10:17 10:17 10:17 


 


WBC  16.7 H    (3.8-10.6)  k/uL


 


RBC  4.74    (3.80-5.40)  m/uL


 


Hgb  14.6    (11.4-16.0)  gm/dL


 


Hct  44.2    (34.0-46.0)  %


 


MCV  93.2    (80.0-100.0)  fL


 


MCH  30.8    (25.0-35.0)  pg


 


MCHC  33.0    (31.0-37.0)  g/dL


 


RDW  12.8    (11.5-15.5)  %


 


Plt Count  286    (150-450)  k/uL


 


MPV  8.1    


 


Neutrophils %  89    %


 


Lymphocytes %  6    %


 


Monocytes %  4    %


 


Eosinophils %  1    %


 


Basophils %  0    %


 


Neutrophils #  14.9 H    (1.3-7.7)  k/uL


 


Lymphocytes #  1.0    (1.0-4.8)  k/uL


 


Monocytes #  0.6    (0-1.0)  k/uL


 


Eosinophils #  0.1    (0-0.7)  k/uL


 


Basophils #  0.0    (0-0.2)  k/uL


 


PT   9.3   (9.0-12.0)  sec


 


INR   0.9   (<1.2)  


 


APTT   20.8 L   (22.0-30.0)  sec


 


D-Dimer   1.25 H   (<0.60)  mg/L FEU


 


Sodium    137  (137-145)  mmol/L


 


Potassium    4.0  (3.5-5.1)  mmol/L


 


Chloride    103  ()  mmol/L


 


Carbon Dioxide    23  (22-30)  mmol/L


 


Anion Gap    11  mmol/L


 


BUN    13  (7-17)  mg/dL


 


Creatinine    0.84  (0.52-1.04)  mg/dL


 


Est GFR (CKD-EPI)AfAm    >90  (>60 ml/min/1.73 sqM)  


 


Est GFR (CKD-EPI)NonAf    >90  (>60 ml/min/1.73 sqM)  


 


Glucose    102 H  (74-99)  mg/dL


 


Calcium    9.0  (8.4-10.2)  mg/dL


 


Magnesium    1.8  (1.6-2.3)  mg/dL


 


Total Bilirubin    1.0  (0.2-1.3)  mg/dL


 


AST    27  (14-36)  U/L


 


ALT    29  (4-34)  U/L


 


Alkaline Phosphatase    69  ()  U/L


 


Troponin I     (0.000-0.034)  ng/mL


 


Total Protein    7.0  (6.3-8.2)  g/dL


 


Albumin    4.2  (3.5-5.0)  g/dL


 


TSH    1.520  (0.465-4.680)  mIU/L


 


HCG, Qual    Not Detected  


 


Influenza Type A (PCR)     (Not Detectd)  


 


Influenza Type B (PCR)     (Not Detectd)  


 


RSV (PCR)     (Not Detectd)  


 


SARS-CoV-2 (PCR)     (Not Detectd)  














  04/25/23 04/25/23 Range/Units





  10:17 10:17 


 


WBC    (3.8-10.6)  k/uL


 


RBC    (3.80-5.40)  m/uL


 


Hgb    (11.4-16.0)  gm/dL


 


Hct    (34.0-46.0)  %


 


MCV    (80.0-100.0)  fL


 


MCH    (25.0-35.0)  pg


 


MCHC    (31.0-37.0)  g/dL


 


RDW    (11.5-15.5)  %


 


Plt Count    (150-450)  k/uL


 


MPV    


 


Neutrophils %    %


 


Lymphocytes %    %


 


Monocytes %    %


 


Eosinophils %    %


 


Basophils %    %


 


Neutrophils #    (1.3-7.7)  k/uL


 


Lymphocytes #    (1.0-4.8)  k/uL


 


Monocytes #    (0-1.0)  k/uL


 


Eosinophils #    (0-0.7)  k/uL


 


Basophils #    (0-0.2)  k/uL


 


PT    (9.0-12.0)  sec


 


INR    (<1.2)  


 


APTT    (22.0-30.0)  sec


 


D-Dimer    (<0.60)  mg/L FEU


 


Sodium    (137-145)  mmol/L


 


Potassium    (3.5-5.1)  mmol/L


 


Chloride    ()  mmol/L


 


Carbon Dioxide    (22-30)  mmol/L


 


Anion Gap    mmol/L


 


BUN    (7-17)  mg/dL


 


Creatinine    (0.52-1.04)  mg/dL


 


Est GFR (CKD-EPI)AfAm    (>60 ml/min/1.73 sqM)  


 


Est GFR (CKD-EPI)NonAf    (>60 ml/min/1.73 sqM)  


 


Glucose    (74-99)  mg/dL


 


Calcium    (8.4-10.2)  mg/dL


 


Magnesium    (1.6-2.3)  mg/dL


 


Total Bilirubin    (0.2-1.3)  mg/dL


 


AST    (14-36)  U/L


 


ALT    (4-34)  U/L


 


Alkaline Phosphatase    ()  U/L


 


Troponin I  <0.012   (0.000-0.034)  ng/mL


 


Total Protein    (6.3-8.2)  g/dL


 


Albumin    (3.5-5.0)  g/dL


 


TSH    (0.465-4.680)  mIU/L


 


HCG, Qual    


 


Influenza Type A (PCR)   Not Detected  (Not Detectd)  


 


Influenza Type B (PCR)   Not Detected  (Not Detectd)  


 


RSV (PCR)   Not Detected  (Not Detectd)  


 


SARS-CoV-2 (PCR)   Not Detected  (Not Detectd)  














Disposition


Clinical Impression: 


 Tachycardia





Disposition: ADMITTED AS IP TO THIS HOSP


Referrals: 


Nighat Rubin MD [Primary Care Provider] - 1-2 days


Time of Disposition: 14:07

## 2023-04-25 NOTE — P.CONS
History of Present Illness





- Reason for Consult


Consult date: 04/25/23


Sepsis


Requesting physician: Gi Ledbetter





- Chief Complaint


Shortness of breath and chest pain x one day





- History of Present Illness


Patient is a 32-year-old  female with a past medical history 

significant for asthma as a child history of GERD reflux patient is on birth 

control pills presenting to the ER this morning after pain the patient woke up 

around 3 with a pressure in her chest worse with a deep breathing symptoms 

started suddenly and progressively get worse pulmonary to come to the hospital 

before the patient went to bed he did not have any symptoms patient did have 

some nausea but no vomiting has been complaining of some headache but no other 

URI symptoms no significant cough or sputum production no abdominal pain or any 

diarrhea with the symptom the patient has been evaluated by the ER physician on 

arrival to the ER the patient was afebrile subsequently did have a low-grade 

fever of 99.9 F patient was tachycardic not hypoxic or need for supplemental 

oxygen hematocrit 16.7 with a left shift D-dimer some elevated 1.25 kidney 

function was normal electrolytes were normal liver enzymes are normal did have a

positive UA urine drug screen was negative influenza RSV and COVID testing was 

negative patient did have a chest x-ray no acute process also have a CT 

angiogram of the chest that was suboptimal for pulmonary emboli no definite 

large central embolus no acute pulmonary process that time he had an hernia 

patient was started on Rocephin infectious disease was consulted for further 

management of antibiotic therapy








Review of Systems


Positive point and negatives has been  mentioned in the HPI, complete review of 

systems was performed and all other systems are negative








Past Medical History


Past Medical History: GERD/Reflux


Additional Past Medical History / Comment(s): Hx Asthma as a child, Hx stomach 

ulcers ., gall bladder pain and diarrhea.


History of Any Multi-Drug Resistant Organisms: None Reported


Past Surgical History: Cholecystectomy, Orthopedic Surgery


Additional Past Surgical History / Comment(s): rt wrist surgery with hardware 

and removal, egd.


Past Anesthesia/Blood Transfusion Reactions: Motion Sickness


Additional Past Anesthesia/Blood Transfusion Reaction / Comm: "Woke up too 

early."


Past Psychological History: No Psychological Hx Reported


Smoking Status: Never smoker


Past Alcohol Use History: Occasional


Past Drug Use History: None Reported





- Past Family History


  ** Mother


Family Medical History: No Reported History





Medications and Allergies


                                Home Medications











 Medication  Instructions  Recorded  Confirmed  Type


 


Cetirizine HCl [Zyrtec] 10 mg PO DAILY 08/06/20 04/25/23 History


 


norethindrone-e.estradioL-iron 1 tab PO DAILY 04/25/23 04/25/23 History





[Amanda Fe 1.5-30 Tablet]    


 


Acetaminophen Tab [Tylenol] 650 mg PO Q4HR PRN  tab 04/27/23  Rx


 


Aspirin 81 mg PO DAILY #30 tab 04/27/23  Rx


 


Atorvastatin [Lipitor] 20 mg PO HS #30 tab 04/27/23  Rx


 


cefUROXime axetiL [Ceftin] 500 mg PO BID 7 Days #14 tab 04/27/23  Rx








                                    Allergies











Allergy/AdvReac Type Severity Reaction Status Date / Time


 


cat dander Allergy Unknown Unknown Verified 04/25/23 09:48


 


bacitracin Allergy  Rash/Hives Verified 04/25/23 09:48





[From Neosporin     





([id number])]     


 


neomycin Allergy  Rash/Hives Verified 04/25/23 09:48





[From Neosporin     





([id number])]     


 


polymyxin B Allergy  Rash/Hives Verified 04/25/23 09:48





[From Neosporin     





(qkw-[id number])]     


 


adhesive AdvReac Unknown rash, Verified 04/25/23 09:48





   tears skin  














Physical Exam


Vitals: 


                                   Vital Signs











  Temp Pulse Resp BP Pulse Ox


 


 04/25/23 13:17   105 H   


 


 04/25/23 13:16   133 H    97


 


 04/25/23 12:16  98.3 F  101 H  18  117/75  97


 


 04/25/23 10:00   123 H  18  114/98  97


 


 04/25/23 08:59  98.3 F  140 H  18  123/84  98








                                Intake and Output











 04/25/23 04/25/23 04/25/23





 06:59 14:59 22:59


 


Other:   


 


  Weight  108.862 kg 











GENERAL DESCRIPTION: Middle-aged female lying in bed, no distress. No tachypnea 

or accessory muscle of respiration use.


HEENT: Shows Pallor , no scleral icterus. Oral mucous membrane is dry. No 

pharyngeal erythema or thrush


NECK: Trachea central, no thyromegaly.


LUNGS: Unlabored breathing. Clear to auscultation anteriorly. No wheeze or 

crackle.


HEART: S1, S2, regular rate and rhythm. No loud murmur


ABDOMEN: Soft, no tenderness , guarding or rigidity, no organomegaly


EXTREMITIES: No edema of feet.


SKIN: No rash, no masses palpable.


NEUROLOGICAL: The patient is awake, alert, oriented x3, mood and affect normal.

















Results


CBC & Chem 7: 


                                 04/26/23 07:40





                                 04/26/23 07:40


Labs: 


                  Abnormal Lab Results - Last 24 Hours (Table)











  04/25/23 04/25/23 04/25/23 Range/Units





  10:17 10:17 10:17 


 


WBC  16.7 H    (3.8-10.6)  k/uL


 


Neutrophils #  14.9 H    (1.3-7.7)  k/uL


 


APTT   20.8 L   (22.0-30.0)  sec


 


D-Dimer   1.25 H   (<0.60)  mg/L FEU


 


Glucose    102 H  (74-99)  mg/dL


 


Urine Appearance     (Clear)  


 


Urine Ketones     (Negative)  


 


Ur Leukocyte Esterase     (Negative)  


 


Urine WBC     (0-5)  /hpf


 


Ur Squamous Epith Cells     (0-4)  /hpf


 


Urine Bacteria     (None)  /hpf


 


Urine Mucus     (None)  /hpf














  04/25/23 Range/Units





  13:41 


 


WBC   (3.8-10.6)  k/uL


 


Neutrophils #   (1.3-7.7)  k/uL


 


APTT   (22.0-30.0)  sec


 


D-Dimer   (<0.60)  mg/L FEU


 


Glucose   (74-99)  mg/dL


 


Urine Appearance  Cloudy H  (Clear)  


 


Urine Ketones  Trace H  (Negative)  


 


Ur Leukocyte Esterase  Moderate H  (Negative)  


 


Urine WBC  7 H  (0-5)  /hpf


 


Ur Squamous Epith Cells  13 H  (0-4)  /hpf


 


Urine Bacteria  Few H  (None)  /hpf


 


Urine Mucus  Occasional H  (None)  /hpf














Assessment and Plan


(1) SIRS (systemic inflammatory response syndrome)


Status: Acute   Code(s): R65.10 - SIRS OF NON-INFECTIOUS ORIGIN W/O ACUTE ORGAN 

DYSFUNCTION   SNOMED Code(s): 825222588


   


Plan: 


1patient was in the hospital with SIRS in this patient who did have a low-grade

fever tachycardia she did have predominantly respiratory symptoms however CT 

angiogram of the chest as well as chest x-ray mention acute consolidation or 

infiltrate suggestive of pneumonia CT normal chest also did not show a large 

pulmonary embolus, patient did have mildly positive UA however abdominal soft on

clinical examination no evidence of any joint swelling or cellulitis


2-we will check inflammatory markers and check blood cultures


3-May continue empiric Rocephin while waiting for the work-up to be completed


We will follow on clinical condition and cultures to further adjust medication 

if needed


Thank you for this consultation we will follow the patient along with you





Time with Patient: Greater than 30

## 2023-04-26 LAB
ANION GAP SERPL CALC-SCNC: 10.3 MMOL/L (ref 10–18)
BASOPHILS # BLD AUTO: 0.04 X 10*3/UL (ref 0–0.1)
BASOPHILS NFR BLD AUTO: 0.4 %
BUN SERPL-SCNC: 7.4 MG/DL (ref 9–27)
BUN/CREAT SERPL: 9.25 RATIO (ref 12–20)
CALCIUM SPEC-MCNC: 8.7 MG/DL (ref 8.7–10.3)
CHLORIDE SERPL-SCNC: 108 MMOL/L (ref 96–109)
CHOLEST SERPL-MCNC: 206 MG/DL (ref 0–200)
CO2 SERPL-SCNC: 21.7 MMOL/L (ref 20–27.5)
EOSINOPHIL # BLD AUTO: 0.27 X 10*3/UL (ref 0.04–0.35)
EOSINOPHIL NFR BLD AUTO: 2.7 %
ERYTHROCYTE [DISTWIDTH] IN BLOOD BY AUTOMATED COUNT: 4.1 X 10*6/UL (ref 4.1–5.2)
ERYTHROCYTE [DISTWIDTH] IN BLOOD: 12.7 % (ref 11.5–14.5)
GLUCOSE SERPL-MCNC: 97 MG/DL (ref 70–110)
HCT VFR BLD AUTO: 39.1 % (ref 37.2–46.3)
HDLC SERPL-MCNC: 50.3 MG/DL (ref 40–60)
HGB BLD-MCNC: 12.8 G/DL (ref 12–15)
IMM GRANULOCYTES BLD QL AUTO: 0.3 %
LDLC SERPL CALC-MCNC: 129.5 MG/DL (ref 0–131)
LYMPHOCYTES # SPEC AUTO: 1.61 X 10*3/UL (ref 0.9–5)
LYMPHOCYTES NFR SPEC AUTO: 16.2 %
MCH RBC QN AUTO: 31.2 PG (ref 27–32)
MCHC RBC AUTO-ENTMCNC: 32.7 G/DL (ref 32–37)
MCV RBC AUTO: 95.4 FL (ref 80–97)
MONOCYTES # BLD AUTO: 0.48 X 10*3/UL (ref 0.2–1)
MONOCYTES NFR BLD AUTO: 4.8 %
NEUTROPHILS # BLD AUTO: 7.51 X 10*3/UL (ref 1.8–7.7)
NEUTROPHILS NFR BLD AUTO: 75.6 %
NRBC BLD AUTO-RTO: 0 /100 WBCS (ref 0–0)
PLATELET # BLD AUTO: 245 X 10*3/UL (ref 140–440)
POTASSIUM SERPL-SCNC: 4 MMOL/L (ref 3.5–5.5)
SODIUM SERPL-SCNC: 140 MMOL/L (ref 135–145)
TRIGL SERPL-MCNC: 131 MG/DL (ref 0–149)
VLDLC SERPL CALC-MCNC: 26.2 MG/DL (ref 5–40)
WBC # BLD AUTO: 9.94 X 10*3/UL (ref 4.5–10)

## 2023-04-26 RX ADMIN — HEPARIN SODIUM SCH: 5000 INJECTION INTRAVENOUS; SUBCUTANEOUS at 08:04

## 2023-04-26 RX ADMIN — HEPARIN SODIUM SCH: 5000 INJECTION INTRAVENOUS; SUBCUTANEOUS at 20:37

## 2023-04-26 RX ADMIN — CEFAZOLIN SCH MLS/HR: 330 INJECTION, POWDER, FOR SOLUTION INTRAMUSCULAR; INTRAVENOUS at 08:02

## 2023-04-26 RX ADMIN — NITROGLYCERIN SCH: 20 OINTMENT TOPICAL at 05:12

## 2023-04-26 RX ADMIN — LORATADINE SCH MG: 10 TABLET ORAL at 08:04

## 2023-04-26 RX ADMIN — CEFAZOLIN SCH MLS/HR: 330 INJECTION, POWDER, FOR SOLUTION INTRAMUSCULAR; INTRAVENOUS at 17:34

## 2023-04-26 NOTE — CT
EXAMINATION TYPE: CT pelvis wo con

 

DATE OF EXAM: 4/26/2023

 

COMPARISON: None.

 

HISTORY: pelvic pain. Endometritis/pelvic infection.

 

CT DLP: 989.2 mGycm

Automated exposure control for dose reduction was used.

 

FINDINGS: 

Normal-appearing appendix from the base of cecum near coronal image 30. No suspicious small or large 
bowel dilatation.

 

Anteverted uterus. No concerning adnexal masses. No free fluid.

 

Urinary bladder appears within normal limits. Visualized osseous structures are intact. 

 

Tiny fat-containing umbilical hernia incidentally noted.

 

IMPRESSION: Unremarkable noncontrast pelvic CT study.

## 2023-04-26 NOTE — CA
Stress Echo 

 Report 

 

 Alejandra Galindo 

 

 Age:    32     Gender:    F 

 

 :    1990 

 

 Exam Date:     2023 12:02 

 

 Exam Location: [facility] 

 

 Ht (in):     64     Wt (lb):    240 

 

 Ordering Physician:        Lili Yuen 

 

 Referring Physician:       [id number] 

 

 Technician:                Cely Ford RDCS 

 Technologist 

 MRN:    [id number] 

 

 Procedure CPT: 

 

 Indication:        CP, SOB 

 

 ICD-9 Codes: 

 

 Rhythm: 

 

 Patient History:                      Cardiac arrhythmia 

 

 Cardiac Medications:                  NONE 

 

 Medications in past 24 hours: 

 

 Contrast: 

 

 Stress Results 

 

 Protocol:     Jaylon 

 

 Total dose(mL): 

 

 Exercise Duration (min:sec):          6:17 

 Max ST Depression (mm): 

 Angina Score: 

 Maria Score: 

 METS:    7.3 

 

 Resting HR:      128       Resting BP:     155    /   87 

 

 Peak HR:     171       Peak BP:     210   /   63 

 

 Max Predicted HR:       187 

 

 91     % Max Predicted HR 

 

 Target HR:     159 

 

 Double Product:       [id number] 

 

 Stress Summary:                The patient's target heart rate was  

                                achieved 

 

 BP Response:                   Normal 

 

 Reason for Termination:        Reached target heart rate or work-load 

 

 Cardiac Symptoms:              Test terminated after reaching target  

                                heart rate (85% max predicted) 

 

 ECG Analysis 

 

 Resting ECG: 

 

 Stress ECG: 

 

 Arrhythmia: 

 Echo Analysis 

 

 Resting Echo: 

 

 Peak Echo Analysis: 

 

 MEASUREMENTS (Male/Female) Normal Values 

 

 

 

 

 CONCLUSIONS 

 Good exercise tolerance 

 Normal EKG and echo in response to exercise 

 

 Dr. Gomez Garcia MD 

 (Electronically Signed) 

 Final Date:      2023 12:48

## 2023-04-26 NOTE — P.CRDCN
History of Present Illness


History of present illness: 





HISTORY OF PRESENT ILLNESS:  





This is a 32-year-old female with a past medical history significant for 

prediabetes and hyperlipidemia. Patient does not follow with a cardiologist. We 

have been asked to see the patient in consultation for chest pain. Patient 

examined at the bedside.  Patient states that yesterday morning she woke up 

around 3:00 in the morning from sleep with shortness of breath and palpitations.

 She reports chest pain when she was trying to take a deep breath yesterday.  

She reports feeling dizzy yesterday which has since resolved.  She reports her 

shortness of breath is better this morning.  She denies any further episodes of 

palpitations.  Telemetry reveals sinus mechanism.  The patient was tachycardic 

when she arrived to the hospital.  The patient reports a history of 

hyperlipidemia and states that she used to be on statin therapy.  She states she

had a lap erich performed in December 2022 and stopped taking her statin at that

time and never resumed it.  At the time of examination, the patient denies chest

pain or pressure.





* EKG reveals sinus tachycardia with no signs of acute ischemia


* Chest xray negative for acute process


* Chest CTA: Suboptimal exam for assessment of pulmonary embolism.  No definite 

  large central PE.  Lobar, segmental, and more distal arterial branches are 

  very limited or nondiagnostic.  No acute pulmonary process.  Tiny hiatal 

  hernia.


* Laboratory data: WBC 16.7.  Hemoglobin 14.6.  Platelet count 286.  D-dimer 

  1.25.  Sodium 137.  Potassium 4.0.  BUN 13.  Creatinine 0.84.  Troponin 

  negative 3.  TSH 1.520.  Pro calcitonin 0.10


* Current home cardiac medications include none


* No previous echocardiogram or cardiac catheterization available for review





REVIEW OF SYSTEMS: 


At the time of my exam:


CONSTITUTIONAL: Denies fever or chills.


HEENT: Denies blurred vision, vision changes, or eye pain. Denies hemoptysis 


CARDIOVASCULAR: Denies chest pain. Denies orthopnea. Denies PND. Denies 

palpitations


RESPIRATORY: Denies shortness of breath. 


GASTROINTESTINAL: Denies abdominal pain. Denies nausea or vomiting. 


HEMATOLOGIC: Denies bleeding disorders.


GENITOURINARY:  Denies any blood in urine.


SKIN: Denies pruitis. Denies rash.





PHYSICAL EXAM: 


VITAL SIGNS: Reviewed.


GENERAL: Well-developed in no acute distress. 


HEENT: Head is normocephalic. Pupils are equal, round. Sclerae anicteric. Mucous

membranes of the mouth are moist. Neck supple. No JVD or thyromegaly


LUNGS: Respirations even and unlabored. Lungs essentially clear to auscultation 

bilaterally.


HEART: Regular rate and rhythm.  S1 and S2 heard.


ABDOMEN: Soft. Nondistended. Nontender.


EXTREMITIES: Normal range of motion.  No clubbing or cyanosis.  Peripheral 

pulses intact.  No lower extremity edema


NEUROLOGIC: Awake and alert. Oriented x 3. 





ASSESSMENT: 


Chest pain, troponins negative 3


Sinus tachycardia


Leukocytosis with low-grade fever, etiology unclear


Dizziness, resolved


Elevated d-dimer, CT negative for PE


Prediabetic, per patient


Hyperlipidemia, stopped taking statin therapy after lap erich


Morbid obesity





PLAN: 


Obtain 2D echo to assess cardiac structure and function


Decrease aspirin to 81 mg daily


Add atorvastatin 20 mg at night.  Obtain lipid panel


Obtain hemoglobin A1c


Continue telemetry monitoring


Patient to undergo stress echocardiogram today


Infectious workup per ID. Continue antibiotics per Dr. Sheth


Further recommendations pending patient course








Nurse practitioner note has been reviewed by physician. Signing provider agrees 

with the documented findings, assessment, and plan of care. 














Past Medical History


Past Medical History: GERD/Reflux


Additional Past Medical History / Comment(s): Hx Asthma as a child, Hx stomach 

ulcers ., gall bladder pain and diarrhea.


History of Any Multi-Drug Resistant Organisms: None Reported


Past Surgical History: Cholecystectomy, Orthopedic Surgery


Additional Past Surgical History / Comment(s): rt wrist surgery with hardware 

and removal, egd.


Past Anesthesia/Blood Transfusion Reactions: Motion Sickness


Additional Past Anesthesia/Blood Transfusion Reaction / Comment(s): "Woke up too

early."


Past Psychological History: No Psychological Hx Reported


Smoking Status: Never smoker


Past Alcohol Use History: Occasional


Past Drug Use History: None Reported





- Past Family History


  ** Mother


Family Medical History: No Reported History





Medications and Allergies


                                Home Medications











 Medication  Instructions  Recorded  Confirmed  Type


 


Cetirizine HCl [Zyrtec] 10 mg PO DAILY 08/06/20 04/25/23 History


 


norethindrone-e.estradioL-iron 1 tab PO DAILY 04/25/23 04/25/23 History





[Amanda Fe 1.5-30 Tablet]    








                                    Allergies











Allergy/AdvReac Type Severity Reaction Status Date / Time


 


cat dander Allergy Unknown Unknown Verified 04/25/23 09:48


 


bacitracin Allergy  Rash/Hives Verified 04/25/23 09:48





[From Neosporin     





([id number])]     


 


neomycin Allergy  Rash/Hives Verified 04/25/23 09:48





[From Neosporin     





([id number])]     


 


polymyxin B Allergy  Rash/Hives Verified 04/25/23 09:48





[From Neosporin     





([id number])]     


 


adhesive AdvReac Unknown rash, Verified 04/25/23 09:48





   tears skin  














Physical Exam


Vitals: 


                                   Vital Signs











  Temp Pulse Pulse Resp BP BP Pulse Ox


 


 04/26/23 06:39  98.6 F   99  17   128/80  98


 


 04/26/23 02:40  98.3 F   101 H  18   114/75  99


 


 04/25/23 18:51  98.3 F   96  18   124/79  97


 


 04/25/23 17:33   90   18  124/74   97


 


 04/25/23 16:18   101 H   18  118/80   96


 


 04/25/23 15:00   108 H   18    96


 


 04/25/23 13:17   105 H     


 


 04/25/23 13:16   133 H      97


 


 04/25/23 12:16  98.3 F  101 H   18  117/75   97


 


 04/25/23 10:09  99.9 F H      


 


 04/25/23 10:00   123 H   18  114/98   97


 


 04/25/23 08:59  98.3 F  140 H   18  123/84   98








                                Intake and Output











 04/25/23 04/26/23 04/26/23





 22:59 06:59 14:59


 


Other:   


 


  # Voids 1 2 


 


  Weight 108.862 kg  














Results





                                 04/25/23 10:17





                                 04/25/23 10:17


                                 Cardiac Enzymes











  04/25/23 04/25/23 04/25/23 Range/Units





  10:17 10:17 15:22 


 


AST  27    (14-36)  U/L


 


Lactate Dehydrogenase     (120-246)  U/L


 


Troponin I   <0.012  <0.012  (0.000-0.034)  ng/mL














  04/25/23 04/25/23 Range/Units





  19:27 20:31 


 


AST    (14-36)  U/L


 


Lactate Dehydrogenase   209  (120-246)  U/L


 


Troponin I  <0.012   (0.000-0.034)  ng/mL








                                   Coagulation











  04/25/23 Range/Units





  10:17 


 


PT  9.3  (9.0-12.0)  sec


 


APTT  20.8 L  (22.0-30.0)  sec








                                       CBC











  04/25/23 Range/Units





  10:17 


 


WBC  16.7 H  (3.8-10.6)  k/uL


 


RBC  4.74  (3.80-5.40)  m/uL


 


Hgb  14.6  (11.4-16.0)  gm/dL


 


Hct  44.2  (34.0-46.0)  %


 


Plt Count  286  (150-450)  k/uL








                          Comprehensive Metabolic Panel











  04/25/23 Range/Units





  10:17 


 


Sodium  137  (137-145)  mmol/L


 


Potassium  4.0  (3.5-5.1)  mmol/L


 


Chloride  103  ()  mmol/L


 


Carbon Dioxide  23  (22-30)  mmol/L


 


BUN  13  (7-17)  mg/dL


 


Creatinine  0.84  (0.52-1.04)  mg/dL


 


Glucose  102 H  (74-99)  mg/dL


 


Calcium  9.0  (8.4-10.2)  mg/dL


 


AST  27  (14-36)  U/L


 


ALT  29  (4-34)  U/L


 


Alkaline Phosphatase  69  ()  U/L


 


Total Protein  7.0  (6.3-8.2)  g/dL


 


Albumin  4.2  (3.5-5.0)  g/dL








                               Current Medications











Generic Name Dose Route Start Last Admin





  Trade Name Freq  PRN Reason Stop Dose Admin


 


Acetaminophen  650 mg  04/25/23 20:10  04/25/23 20:24





  Acetaminophen Tab 325 Mg Tab  PO   650 mg





  Q4HR PRN   Administration





  Fever and/ or Mild Pain  


 


Alprazolam  0.25 mg  04/25/23 14:42 





  Alprazolam 0.25 Mg Tab  PO  





  TID PRN  





  Anxiety  


 


Aspirin  325 mg  04/26/23 09:00  04/26/23 08:04





  Aspirin 325 Mg Tab  PO   325 mg





  DAILY DANAY   Administration


 


Heparin Sodium (Porcine)  5,000 unit  04/25/23 14:45  04/26/23 08:04





  Heparin Sodium,Porcine/Pf 5,000 Unit/0.5 Ml Syringe  SQ   Not Given





  Q12HR DANAY  


 


Ceftriaxone Sodium 1 gm/  50 mls @ 100 mls/hr  04/25/23 14:45  04/26/23 08:02





  Sodium Chloride  IVPB   100 mls/hr





  Q24HR DANAY   Administration





  Protocol  


 


Sodium Chloride  1,000 mls @ 100 mls/hr  04/25/23 21:30  04/26/23 08:02





  Saline 0.9%  IV   100 mls/hr





  .Q10H DANAY   Administration


 


Loratadine  10 mg  04/26/23 09:00  04/26/23 08:04





  Loratadine 10 Mg Tab  PO   10 mg





  DAILY DANAY   Administration


 


Nitroglycerin  0.4 mg  04/25/23 14:08 





  Nitroglycerin Sl Tabs 0.4 Mg Tab  SUBLINGUAL  





  Q5M PRN  





  Chest Pain  








                                Intake and Output











 04/25/23 04/26/23 04/26/23





 22:59 06:59 14:59


 


Other:   


 


  # Voids 1 2 


 


  Weight 108.862 kg  








                                        





                                 04/25/23 10:17 





                                 04/25/23 10:17

## 2023-04-26 NOTE — P.PN
Subjective


Progress Note Date: 04/26/23


Principal diagnosis: 


Fever





Patient is a 32-year-old  female with a past medical history 

significant for asthma as a child history of GERD reflux patient is on birth 

control pills presenting to the ER with symptoms of chest pressure or shortness 

of breath noticed to be tachycardic and did have a low-grade fever, CT angiogram

of the chest did not show large central PE and an ammonia did have mildly 

positive UA and apparently the patient recently did have a pelvic infection 

diagnosed by her OB and treated with antibiotics patient not sure about the name


On today's evaluation of that is 04/26/2023, the patient is afebrile patient is 

very slightly comfortably and is on room air.  Denies having any chest pain no 

cough or sputum production no abdominal pain no diarrhea no urinary symptoms








Objective





- Vital Signs


Vital signs: 


                                   Vital Signs











Temp  98.6 F   04/26/23 06:39


 


Pulse  99   04/26/23 08:31


 


Resp  17   04/26/23 08:31


 


BP  128/80   04/26/23 06:39


 


Pulse Ox  98   04/26/23 06:39


 


FiO2      








                                 Intake & Output











 04/25/23 04/26/23 04/26/23





 18:59 06:59 18:59


 


Weight 108.862 kg 108.862 kg 


 


Other:   


 


  Voiding Method   Toilet


 


  # Voids  2 














- Exam


GENERAL DESCRIPTION: Middle-aged female lying in bed in no distress





RESPIRATORY SYSTEM: Unlabored breathing , decreased breath sounds at bases





HEART: S1 S2 regular rate and rhythm ,





ABDOMEN: Soft , no tenderness





EXTREMITIES: No edema feet








- Labs


CBC & Chem 7: 


                                 04/26/23 07:40





                                 04/26/23 07:40


Labs: 


                  Abnormal Lab Results - Last 24 Hours (Table)











  04/25/23 04/25/23 04/25/23 Range/Units





  10:17 10:17 10:17 


 


WBC  16.7 H    (3.8-10.6)  k/uL


 


Neutrophils #  14.9 H    (1.3-7.7)  k/uL


 


APTT   20.8 L   (22.0-30.0)  sec


 


D-Dimer   1.25 H   (<0.60)  mg/L FEU


 


Glucose    102 H  (74-99)  mg/dL


 


C-Reactive Protein     (<1.0)  mg/dL


 


Procalcitonin     (0.02-0.09)  ng/mL


 


Urine Appearance     (Clear)  


 


Urine Ketones     (Negative)  


 


Ur Leukocyte Esterase     (Negative)  


 


Urine WBC     (0-5)  /hpf


 


Ur Squamous Epith Cells     (0-4)  /hpf


 


Urine Bacteria     (None)  /hpf


 


Urine Mucus     (None)  /hpf














  04/25/23 04/25/23 04/25/23 Range/Units





  13:41 20:31 20:31 


 


WBC     (3.8-10.6)  k/uL


 


Neutrophils #     (1.3-7.7)  k/uL


 


APTT     (22.0-30.0)  sec


 


D-Dimer     (<0.60)  mg/L FEU


 


Glucose     (74-99)  mg/dL


 


C-Reactive Protein   5.7 H   (<1.0)  mg/dL


 


Procalcitonin    0.10 H  (0.02-0.09)  ng/mL


 


Urine Appearance  Cloudy H    (Clear)  


 


Urine Ketones  Trace H    (Negative)  


 


Ur Leukocyte Esterase  Moderate H    (Negative)  


 


Urine WBC  7 H    (0-5)  /hpf


 


Ur Squamous Epith Cells  13 H    (0-4)  /hpf


 


Urine Bacteria  Few H    (None)  /hpf


 


Urine Mucus  Occasional H    (None)  /hpf














Assessment and Plan


(1) SIRS (systemic inflammatory response syndrome)


Current Visit: Yes   Status: Acute   Code(s): R65.10 - SIRS OF NON-INFECTIOUS 

ORIGIN W/O ACUTE ORGAN DYSFUNCTION   SNOMED Code(s): 394308771


   


Plan: 


1patient was in the hospital with SIRS in this patient who did have a low-grade

fever tachycardia she did have predominantly respiratory symptoms however CT 

angiogram of the chest as well as chest x-ray mention acute consolidation or 

infiltrate suggestive of pneumonia CT normal chest also did not show a large 

pulmonary embolus, patient did have mildly positive UA however abdominal soft on

clinical examination no evidence of any joint swelling or cellulitis


2-we will check CT of the pelvis with a recent history of pelvic infection to 

make sure not the source of current symptoms


Patient will continue Rocephin while waiting for the cultures to finalize


Time with Patient: Less than 30

## 2023-04-26 NOTE — HP
HISTORY AND PHYSICAL



CHIEF COMPLAINT:

Chest discomfort and palpitation.



HISTORY OF PRESENT ILLNESS:

This is a 32-year-old woman, who was previously healthy except asthma, as well as

stomach ulcers, and GERD, was complaining of some chest discomfort when the patient

woke up at 3 a.m., some difficulty with deep breathing and the patient came to [facility], found to have tachycardia.  The basic labs showed WBC 16.7.  Some mild

fever is suspected.  D-dimer is 1.25.  UA showed some minimal abnormalities and the

patient admitted for further evaluation and treatment.  The chest CTA showed suboptimal

exam, but no definite large pulmonary emboli was noted.  A tiny hiatal hernia was also

noted.  There is no history of headache, loss of conscious, or seizures.



PAST MEDICAL HISTORY:

GERD, history of asthma, cholecystectomy.  The rest of the chart and rest of the

diagnosis are reviewed.



HOME MEDICATIONS:

Aspirin.



ALLERGIES:

Cat dander. Rest of the allergies are noted.



FAMILY HISTORY:

No history of heart disease or strokes in the family.



SOCIAL HISTORY:

No history of smoking.  Occasional alcohol.



REVIEW OF SYSTEMS:

A 14-point review is negative except as mentioned earlier.



PHYSICAL EXAMINATION:

VITAL SIGNS: Pulse is 101, blood pressure 170/74, respirations 18.

HEENT:  Conjunctivae normal.

NECK:  No JVD.

CARDIOVASCULAR:  S1, S2, tachycardic.

RESPIRATIONS:  Clear to auscultation.

ABDOMEN:  Soft, nontender.

LEGS:  No edema. No swelling.

NERVOUS SYSTEM:  No focal deficits.

SKIN:  No ulcer, rash, bleeding.

JOINTS:  No active deforming arthropathy.



LABORATORY DATA:

WBC 16.6. Rest of the labs are noted.



ASSESSMENT:

1. Tachycardia.  Chest discomfort for evaluation.

2. CT angio reviewed.

3. Elevated D-dimer.

4. Rule out acute urinary tract infection and sepsis.

5. Gastroesophageal reflux disease.

6. History of asthma.

7. History of stomach ulcers.

8. History of cholecystitis and cholecystectomy.



RECOMMENDATIONS AND DISCUSSION:

This is a 32-year-old woman, who presented with multiple complex medical issues.  We

will monitor the patient closely.  I would recommend continue the current medications.

I would recommend Cardiology, Infectious Disease evaluation.  Cultures will be obtained

both urine and blood and I would also initiate empiric antibiotics also.  Ultrasound of

the urinary bladder also will be recommended.  Further recommendations to follow.





MMODL / IJN: [id number] / Job#: [id number]

## 2023-04-27 VITALS — RESPIRATION RATE: 17 BRPM

## 2023-04-27 VITALS — SYSTOLIC BLOOD PRESSURE: 115 MMHG | DIASTOLIC BLOOD PRESSURE: 80 MMHG | TEMPERATURE: 98.7 F | HEART RATE: 92 BPM

## 2023-04-27 RX ADMIN — CEFAZOLIN SCH MLS/HR: 330 INJECTION, POWDER, FOR SOLUTION INTRAMUSCULAR; INTRAVENOUS at 04:12

## 2023-04-27 RX ADMIN — LORATADINE SCH MG: 10 TABLET ORAL at 09:01

## 2023-04-27 RX ADMIN — HEPARIN SODIUM SCH: 5000 INJECTION INTRAVENOUS; SUBCUTANEOUS at 09:04

## 2023-04-27 NOTE — CA
Transthoracic Echo Report 

 Name: Alejandra Galindo 

 MRN:    [id number] 

 Age:    32     Gender:     F 

 

 :    1990 

 Exam Date:     2023 11:28 

 Exam Location: [facility] 

 Ht (in):     64     Wt (lb):     240 

 Ordering Physician:        Lili Yuen 

 Attending/Referring Phys:         [id number], Alpa 

 Technician         Cely Ford RDCS 

 Procedure CPT: 

 Indications:       LV function 

 

 Cardiac Hx: 

 Technical Quality:      Fair 

 Contrast 1:                                Total Dose (mL): 

 Contrast 2:                                Total Dose (mL): 

 

 MEASUREMENTS  (Male / Female) Normal Values 

 2D ECHO 

 LV Diastolic Diameter PLAX        3.5 cm                4.2 - 5.9 / 3.9 - 5.3 cm 

 LV Systolic Diameter PLAX         1.9 cm                 

 IVS Diastolic Thickness           1.2 cm                0.6 - 1.0 / 0.6 - 0.9 cm 

 LVPW Diastolic Thickness          1.3 cm                0.6 - 1.0 / 0.6 - 0.9 cm 

 LV Relative Wall Thickness        0.7                    

 RV Internal Dim ED PLAX           3.0 cm                 

 

 M-MODE 

 Aortic Root Diameter MM           2.9 cm                 

 LA Systolic Diameter MM           2.1 cm                 

 LA Ao Ratio MM                    0.7                    

 AV Cusp Separation MM             3.5 cm                 

 

 DOPPLER 

 AV Peak Velocity                  143.4 cm/s             

 AV Peak Gradient                  8.2 mmHg               

 AV Mean Velocity                  104.8 cm/s             

 AV Mean Gradient                  5.1 mmHg               

 AV Velocity Time Integral         29.0 cm                

 LVOT Peak Velocity                114.3 cm/s             

 LVOT Peak Gradient                5.2 mmHg               

 LVOT Velocity Time Integral       29.7 cm                

 Mitral E Point Velocity           107.9 cm/s             

 Mitral A Point Velocity           66.3 cm/s              

 Mitral E to A Ratio               1.6                    

 MV Deceleration Time              190.2 ms               

 MV E' Velocity                    15.4 cm/s              

 Mitral E to MV E' Ratio           7.0                    

 TR Peak Velocity                  233.9 cm/s             

 TR Peak Gradient                  21.9 mmHg              

 Right Ventricular Systolic Press  32.5 mmHg              

 

 

 FINDINGS 

 Left Ventricle 

 Mildly increased left ventricular wall thickness. Left ventricular cavity size  

 normal. Normal left ventricular systolic function with no obvious regional wall  

 motion abnormalities. Left ventricular ejection fraction is estimated at 55-60  

 %. 

 

 Right Ventricle 

 Normal right ventricular size and function. Right ventricular systolic pressure  

 within normal limits. 

 

 Right Atrium 

 Normal right atrial size. 

 

 Left Atrium 

 Normal left atrial size. 

 

 Mitral Valve 

 Structurally normal mitral valve. No mitral stenosis, regurgitation or  

 prolapse. 

 

 Aortic Valve 

 Trileaflet aortic valve. No aortic valve stenosis or regurgitation. 

 

 Tricuspid Valve 

 Structurally normal tricuspid valve. Mild tricuspid regurgitation. 

 

 Pulmonic Valve 

 Structurally normal pulmonic valve. Trace pulmonic regurgitation. 

 

 Pericardium 

 No pericardial effusion. 

 

 Aorta 

 Normal size aortic root and proximal ascending aorta. 

 

 CONCLUSIONS 

 Poorly visualized endocardium.  Normal LV systolic function 

 Overall normal intracardiac valves 

 Previewed by:  

 Dr. Gomez Garcia MD 

 (Electronically Signed) 

 Final Date:      2023 09:50

## 2023-04-27 NOTE — PN
PROGRESS NOTE



DATE OF SERVICE:  04/26/2023



SUBJECTIVE:

This is a 32-year-old woman, who was admitted with chest discomfort and palpitation,

tachycardia, is being closely monitored at this time.  UTI is suspected.  Cultures are

negative.  Pelvis CT scan was unremarkable.



OBJECTIVE:

VITAL SIGNS: Pulse is 99, blood pressure 128/80, respirations 17.

CHEST:  Clear to auscultation.

CARDIOVASCULAR: S1, S2 muffled.

ABDOMEN:  Soft.

NERVOUS SYSTEM:  Nonfocal.



LABORATORY DATA:

Reviewed.  Procalcitonin 0.1. UA is abnormal.



ASSESSMENT:

1. Tachycardia and chest discomfort for evaluation.

2. Elevated D-dimer.

3. Possible systemic inflammatory response syndrome with possible acute urinary tract

    infection.

4. Gastroesophageal reflux disease.

5. History of asthma.

6. History of stomach ulcers.

7. History of cholecystitis and cholecystectomy.



RECOMMENDATIONS AND DISCUSSION:

I recommend to continue current medications. Continue symptomatic treatment. Otherwise,

as mentioned earlier, continue with antibiotics.  Follow the cultures.  Closely follow

with Infectious Disease.  Cardiology is also following the patient closely.  Stress

echo was reported as normal and further recommendations to follow.





MMODL / IJN: [id number] / Job#: [id number]

## 2023-04-29 NOTE — P.DS
Providers


Date of admission: 


04/26/23 13:05





Expected date of discharge: 04/27/23


Attending physician: 


Gi Ledbetter





Consults: 





                                        





04/25/23 14:43


Consult Physician Routine 


   Consulting Provider: Alban Sheth


   Consult Reason/Comments: sepsis


   Do you want consulting provider notified?: Yes











Primary care physician: 


Nighat Rubin





Hospital Course: 








Final diagnosis





Tachycardia and chest discomfort, ruled out ACS


Elevated d-dimer with no evidence of PE


Possible Sirs with possible acute urinary tract infection, present on admission


Gastroesophageal reflux disease


History of asthma, not an exacerbation


History of stomach ulcers


History of cholecystitis with cholecystectomy


Morbid obesity with a BMI of 41.2








Discharge disposition


Patient is being discharged in a stable condition with guarded prognosis to 

home.  Patient will follow-up with Dr. Rubin  in the outpatient setting upon

discharge.  Patient is to continue with oral Ceftin 500 mg twice daily for 1 

week and outpatient follow-up with cardiology as scheduled.  Total time taken is

greater than 35 minutes.





Hospital course


This is a 32-year-old female who was recently admitted with chest pain and 

elevated heart rate with concerns of urinary tract infection with SIRS present 

on admission and being closely monitored.  Patient was evaluated by infectious 

disease maintained on ceftriaxone showing clinical improvement and will continue

on oral Ceftin twice daily for the next 1 week.  Patient also evaluated by 

cardiology and was cleared for outpatient follow.  Please refer to consultation 

notes for further HPI. Currently no reports of chest pain, shortness of breath, 

or palpitations.  Patient is afebrile.  No reports of nausea or vomiting and 

patient is tolerating diet.  Patient will be discharged home today.





Physical exam:








Gen: This is a 32-year-old female who is awake, alert and oriented 3, well-

developed, well-nourished, morbidly obese


HEENT: Head is atraumatic, normocephalic. Pupils equal, round. Sclerae is 

anicteric. 


NECK: Supple. No JVD. No lymphadenopathy. No thyromegaly. 


LUNGS: Clear to auscultation. No wheezes or rhonchi.  No intercostal 

retractions.


HEART: Regular rate and rhythm. No murmur. 


ABDOMEN: Soft. Bowel sounds are present. No masses.  Some lower abdominal 

tenderness on palpation.


EXTREMITIES: No pedal edema.  No calf tenderness.


NEUROLOGICAL: Patient is awake, alert and oriented x3. Cranial nerves 2 through 

12 are grossly intact. 





Please refer to medication reconciliation sheet for a list of medications.





The impression and plan of care has been dictated by Carmenza Sheth, Nurse 

Practitioner as directed.





Dr. Anatoly MD


I have performed a history and examination and MDM of this patient, discussed 

the same with the dictator, and  agree with the dictator's assessment and plan 

as written ,documented as a scribe. Based on total visit time,  I have performed

more than 50% of the visit.  


Patient Condition at Discharge: Stable





Plan - Discharge Summary


New Discharge Prescriptions: 


New


   Aspirin 81 mg PO DAILY #30 tab


   Acetaminophen Tab [Tylenol] 650 mg PO Q4HR PRN  tab


     PRN Reason: Fever and/ or Mild Pain


   Atorvastatin [Lipitor] 20 mg PO HS #30 tab


   cefUROXime axetiL [Ceftin] 500 mg PO BID 7 Days #14 tab





Continue


   Cetirizine HCl [Zyrtec] 10 mg PO DAILY


   norethindrone-e.estradioL-iron [Amanda Fe 1.5-30 Tablet] 1 tab PO DAILY


Discharge Medication List





Cetirizine HCl [Zyrtec] 10 mg PO DAILY 08/06/20 [History]


norethindrone-e.estradioL-iron [Amanda Fe 1.5-30 Tablet] 1 tab PO DAILY 04/25/23 

[History]


Acetaminophen Tab [Tylenol] 650 mg PO Q4HR PRN  tab 04/27/23 [Rx]


Aspirin 81 mg PO DAILY #30 tab 04/27/23 [Rx]


Atorvastatin [Lipitor] 20 mg PO HS #30 tab 04/27/23 [Rx]


cefUROXime axetiL [Ceftin] 500 mg PO BID 7 Days #14 tab 04/27/23 [Rx]








Follow up Appointment(s)/Referral(s): 


Nighat Rubin MD [Primary Care Provider] - 1-2 days


Gomez Garcia MD [STAFF PHYSICIAN] - 1 Week (Office will call with appointment 

time and date.)


Patient Instructions/Handouts:  Sepsis (DC), Tachycardia (ED)


Activity/Diet/Wound Care/Special Instructions: 








Activity Limited until follow-up


Continue taking antibiotics until finished


Follow-up with primary care provider on discharge


Follow-up cardiology outpatient


Continue taking antibiotics for 7 days





Discharge Disposition: HOME SELF-CARE

## 2023-05-04 NOTE — P.PN
Subjective


Progress Note Date: 04/27/23


Principal diagnosis: 


Fever





Patient is a 32-year-old  female with a past medical history 

significant for asthma as a child history of GERD reflux patient is on birth 

control pills presenting to the ER with symptoms of chest pressure or shortness 

of breath noticed to be tachycardic and did have a low-grade fever, CT angiogram

of the chest did not show large central PE and an ammonia did have mildly 

positive UA and apparently the patient recently did have a pelvic infection 

diagnosed by her OB and treated with antibiotics patient not sure about the name


On today's evaluation of that is 04/27/2023, the patient remains to be afebrile 

patient is breathing comfortably on room air, the patient denies having any 

chest pain no cough or sputum production no abdominal pain no diarrhea no 

urinary symptoms








Objective





- Vital Signs


Vital signs: 


                                   Vital Signs











Temp  98.7 F   04/27/23 07:19


 


Pulse  92   04/27/23 07:19


 


Resp  17   04/27/23 07:53


 


BP  115/80   04/27/23 07:19


 


Pulse Ox  95   04/27/23 07:19


 


FiO2      








                                 Intake & Output











 04/26/23 04/27/23 04/27/23





 18:59 06:59 18:59


 


Intake Total 118  


 


Balance 118  


 


Intake:   


 


  Oral 118  


 


Other:   


 


  Voiding Method Toilet Toilet Toilet


 


  # Voids 4 1 














- Exam


GENERAL DESCRIPTION: Middle-aged female lying in bed in no distress





RESPIRATORY SYSTEM: Unlabored breathing , decreased breath sounds at bases





HEART: S1 S2 regular rate and rhythm ,





ABDOMEN: Soft , no tenderness





EXTREMITIES: No edema feet








- Labs


CBC & Chem 7: 


                                 04/26/23 07:40





                                 04/26/23 07:40


Labs: 


                  Abnormal Lab Results - Last 24 Hours (Table)











  04/26/23 Range/Units





  07:40 


 


BUN  7.4 L  (9.0-27.0)  mg/dL


 


BUN/Creatinine Ratio  9.25 L  (12.00-20.00)  Ratio


 


Cholesterol  206.00 H  (0..00)  mg/dL








                      Microbiology - Last 24 Hours (Table)











 04/25/23 21:19 Blood Culture - Preliminary





 Blood 


 


 04/25/23 15:21 Urine Culture - Preliminary





 Urine,Voided 














Assessment and Plan


(1) SIRS (systemic inflammatory response syndrome)


Status: Acute   Code(s): R65.10 - SIRS OF NON-INFECTIOUS ORIGIN W/O ACUTE ORGAN 

DYSFUNCTION   SNOMED Code(s): 057606365


   


Plan: 


1patient was in the hospital with SIRS in this patient who did have a low-grade

fever tachycardia she did have predominantly respiratory symptoms however CT 

angiogram of the chest as well as chest x-ray mention acute consolidation or 

infiltrate suggestive of pneumonia CT normal chest also did not show a large 

pulmonary embolus, patient did have mildly positive UA however abdominal soft on

clinical examination no evidence of any joint swelling or cellulitis


2- CT of the pelvis came back negative for any endometritis or other pelvic 

abnormality


3-patient seemed to have shown clinical improvement and wants to go home we will

give a short course of oral Ceftin on discharge and close patient follow-up